# Patient Record
Sex: FEMALE | Race: WHITE | NOT HISPANIC OR LATINO | Employment: FULL TIME | URBAN - METROPOLITAN AREA
[De-identification: names, ages, dates, MRNs, and addresses within clinical notes are randomized per-mention and may not be internally consistent; named-entity substitution may affect disease eponyms.]

---

## 2022-09-01 LAB
HUMAN PAPILLOMAVIRUS (HPV): NORMAL
HUMAN PAPILLOMAVIRUS (HPV): NORMAL
PAP RECOMMENDATION EXT: NORMAL
PAP RECOMMENDATION EXT: NORMAL
PAP SMEAR: NORMAL
PAP SMEAR: NORMAL

## 2022-09-06 LAB
BCS RECOMMENDATION EXT: NORMAL
BMD RECOMMENDATION EXT: NORMAL

## 2022-11-08 LAB — CRC RECOMMENDATION EXT: NORMAL

## 2022-11-28 ENCOUNTER — PATIENT OUTREACH (OUTPATIENT)
Dept: ADMINISTRATIVE | Facility: HOSPITAL | Age: 62
End: 2022-11-28
Payer: COMMERCIAL

## 2022-11-28 NOTE — PROGRESS NOTES
2022 Care Everywhere updates requested and reviewed.  Immunizations reconciled. Media reports reviewed.  Duplicate HM overrides and  orders removed.  Overdue HM topic chart audit and/or requested.  Overdue lab testing linked to upcoming lab appointments if applies.  Lab jame, and Mobile Patrol reviewed   Lab testing    HM updated with external pap/hpv/mammo/dexa report.     Health Maintenance Due   Topic Date Due    Hepatitis C Screening  Never done    Lipid Panel  Never done    HIV Screening  Never done    Colorectal Cancer Screening  Never done    COVID-19 Vaccine (2 - Pfizer series) 2021    Influenza Vaccine (1) 2022

## 2022-11-28 NOTE — LETTER
November 28, 2022    Nenita Armstrong  [address] [facility] [address]             [facility]  [address]  [facility] [address]  Phone: [phone] Dear Rhonda, [facility] is committed to your overall health.  To help you get the most out of each of your visits, we will review your information to make sure you are up to date on all of your recommended tests and/or procedures.      As a new patient to Riky Nielsen MD, we may not have your complete medical records.  After reviewing your chart have found that you may be due for the following:    Health Maintenance Due   Topic    Hepatitis C Screening     Lipid Panel     HIV Screening     Colorectal Cancer Screening     COVID-19 Vaccine (2 - Pfizer series)    Influenza Vaccine        If you have had any of the above done at another facility, please bring the records or information with you so that your record at [facility] will be complete.      If you are currently taking medication, please bring it with you to your appointment for review.    Thank you for letting us care for you,      Your [facility] Team

## 2022-12-12 ENCOUNTER — OFFICE VISIT (OUTPATIENT)
Dept: FAMILY MEDICINE | Facility: CLINIC | Age: 62
End: 2022-12-12
Payer: COMMERCIAL

## 2022-12-12 ENCOUNTER — PATIENT OUTREACH (OUTPATIENT)
Dept: ADMINISTRATIVE | Facility: HOSPITAL | Age: 62
End: 2022-12-12
Payer: COMMERCIAL

## 2022-12-12 VITALS
BODY MASS INDEX: 24.35 KG/M2 | HEIGHT: 63 IN | SYSTOLIC BLOOD PRESSURE: 112 MMHG | WEIGHT: 137.44 LBS | DIASTOLIC BLOOD PRESSURE: 68 MMHG

## 2022-12-12 DIAGNOSIS — E55.9 VITAMIN D DEFICIENCY: ICD-10-CM

## 2022-12-12 DIAGNOSIS — Z00.00 WELLNESS EXAMINATION: Primary | ICD-10-CM

## 2022-12-12 DIAGNOSIS — Z11.59 NEED FOR HEPATITIS C SCREENING TEST: ICD-10-CM

## 2022-12-12 DIAGNOSIS — Z01.89 ENCOUNTER FOR ROUTINE LABORATORY TESTING: ICD-10-CM

## 2022-12-12 DIAGNOSIS — M85.89 OSTEOPENIA OF MULTIPLE SITES: ICD-10-CM

## 2022-12-12 PROCEDURE — 3074F SYST BP LT 130 MM HG: CPT | Mod: CPTII,S$GLB,, | Performed by: INTERNAL MEDICINE

## 2022-12-12 PROCEDURE — 3008F PR BODY MASS INDEX (BMI) DOCUMENTED: ICD-10-PCS | Mod: CPTII,S$GLB,, | Performed by: INTERNAL MEDICINE

## 2022-12-12 PROCEDURE — 99386 PR PREVENTIVE VISIT,NEW,40-64: ICD-10-PCS | Mod: S$GLB,,, | Performed by: INTERNAL MEDICINE

## 2022-12-12 PROCEDURE — 3074F PR MOST RECENT SYSTOLIC BLOOD PRESSURE < 130 MM HG: ICD-10-PCS | Mod: CPTII,S$GLB,, | Performed by: INTERNAL MEDICINE

## 2022-12-12 PROCEDURE — 3078F DIAST BP <80 MM HG: CPT | Mod: CPTII,S$GLB,, | Performed by: INTERNAL MEDICINE

## 2022-12-12 PROCEDURE — 1160F PR REVIEW ALL MEDS BY PRESCRIBER/CLIN PHARMACIST DOCUMENTED: ICD-10-PCS | Mod: CPTII,S$GLB,, | Performed by: INTERNAL MEDICINE

## 2022-12-12 PROCEDURE — 99999 PR PBB SHADOW E&M-EST. PATIENT-LVL III: ICD-10-PCS | Mod: PBBFAC,,, | Performed by: INTERNAL MEDICINE

## 2022-12-12 PROCEDURE — 1159F PR MEDICATION LIST DOCUMENTED IN MEDICAL RECORD: ICD-10-PCS | Mod: CPTII,S$GLB,, | Performed by: INTERNAL MEDICINE

## 2022-12-12 PROCEDURE — 3078F PR MOST RECENT DIASTOLIC BLOOD PRESSURE < 80 MM HG: ICD-10-PCS | Mod: CPTII,S$GLB,, | Performed by: INTERNAL MEDICINE

## 2022-12-12 PROCEDURE — 99386 PREV VISIT NEW AGE 40-64: CPT | Mod: S$GLB,,, | Performed by: INTERNAL MEDICINE

## 2022-12-12 PROCEDURE — 99999 PR PBB SHADOW E&M-EST. PATIENT-LVL III: CPT | Mod: PBBFAC,,, | Performed by: INTERNAL MEDICINE

## 2022-12-12 PROCEDURE — 3008F BODY MASS INDEX DOCD: CPT | Mod: CPTII,S$GLB,, | Performed by: INTERNAL MEDICINE

## 2022-12-12 PROCEDURE — 1160F RVW MEDS BY RX/DR IN RCRD: CPT | Mod: CPTII,S$GLB,, | Performed by: INTERNAL MEDICINE

## 2022-12-12 PROCEDURE — 1159F MED LIST DOCD IN RCRD: CPT | Mod: CPTII,S$GLB,, | Performed by: INTERNAL MEDICINE

## 2022-12-12 RX ORDER — SODIUM PICOSULFATE, MAGNESIUM OXIDE, AND ANHYDROUS CITRIC ACID 10; 3.5; 12 MG/160ML; G/160ML; G/160ML
LIQUID ORAL
COMMUNITY
Start: 2022-10-31 | End: 2024-01-29

## 2022-12-12 NOTE — LETTER
AUTHORIZATION FOR RELEASE OF   CONFIDENTIAL INFORMATION    Dear Chad Valladares MD,    We are seeing Nenita Armstrong, date of birth 1960, in the clinic at [facility]. Riky Nielsen MD is the patient's PCP. Nenita Armstrong has an outstanding lab/procedure at the time we reviewed her chart. In order to help keep her health information updated, she has authorized us to request the following medical record(s):        (  )  MAMMOGRAM                                      (X)  COLONOSCOPY/PATH/RECALL      (  )  PAP SMEAR                                          (  )  OUTSIDE LAB RESULTS     (  )  DEXA SCAN                                          (  )  EYE EXAM            (  )  FOOT EXAM                                          (  )  ENTIRE RECORD     (  )  OUTSIDE IMMUNIZATIONS                 (  )  _______________         Please fax records to [facility], Brandon D Simon-Davis, MD, [phone]    If you have any questions, please contact Michael Alves LPN Care Coordinator  at [phone].            Patient Name: Nenita Armstrong  : 1960  Patient Phone #: [phone]

## 2022-12-12 NOTE — PROGRESS NOTES
Subjective:       Patient ID: Nenita Armstrong is a 62 y.o. female.    Chief Complaint: Establish Care  Gyn: Dr Oliveros    MM2022  Dexa: Osetopenia 2022   Colonoscopy:  2nd Dr Valladares 2022 normal   Immunizations: Flu: Tdap: 2016 Pneumovax: Prevnar 13: Shingles: Covid:   Smoker:  never     HPI    Here to establish care no specific complaints.  Will get fasting labs.        Review of Systems:  Review of Systems   Constitutional:  Negative for appetite change.   HENT:  Negative for nosebleeds.    Eyes:  Negative for pain.   Respiratory:  Negative for choking.    Cardiovascular:  Negative for chest pain.   Gastrointestinal:  Negative for blood in stool.   Genitourinary:  Negative for hematuria.   Musculoskeletal:  Negative for joint swelling.   Skin:  Negative for pallor.   Neurological:  Negative for facial asymmetry.   Hematological:  Does not bruise/bleed easily.   Psychiatric/Behavioral:  Negative for confusion.      Objective:     Wt Readings from Last 3 Encounters:   22 62.4 kg (137 lb 7.3 oz)     Temp Readings from Last 3 Encounters:   No data found for Temp     BP Readings from Last 3 Encounters:   22 112/68         Physical Exam  Constitutional:       Appearance: Normal appearance.   HENT:      Head: Normocephalic and atraumatic.   Eyes:      Extraocular Movements: Extraocular movements intact.      Pupils: Pupils are equal, round, and reactive to light.   Cardiovascular:      Rate and Rhythm: Normal rate and regular rhythm.   Pulmonary:      Effort: Pulmonary effort is normal.      Breath sounds: Normal breath sounds.   Neurological:      Mental Status: She is alert and oriented to person, place, and time.   Psychiatric:         Mood and Affect: Mood normal.       Medication List with Changes/Refills   Current Medications    CLENPIQ 10 MG-3.5 GRAM -12 GRAM/160 ML SOLN    SMARTSIG:Unspecified By Mouth As Directed       Assessment & Plan:  1. Wellness examination    2. Vitamin D  deficiency  - Vitamin D; Future  - Vitamin D    3. Encounter for routine laboratory testing  - CBC Without Differential; Future  - Comprehensive Metabolic Panel; Future  - Lipid Panel; Future  - TSH; Future  - Vitamin D; Future  - Urinalysis; Future  - CBC Without Differential  - Comprehensive Metabolic Panel  - Lipid Panel  - TSH  - Vitamin D  - Urinalysis  - Hepatitis C Antibody; Future  - Hepatitis C Antibody    4. Osteopenia of multiple sites    5. Need for hepatitis C screening test  - Hepatitis C Antibody; Future  - Hepatitis C Antibody     Wellness examination    Vitamin D deficiency  -     Vitamin D; Future; Expected date: 12/12/2022    Encounter for routine laboratory testing  -     CBC Without Differential; Future; Expected date: 12/12/2022  -     Comprehensive Metabolic Panel; Future; Expected date: 12/12/2022  -     Lipid Panel; Future; Expected date: 12/12/2022  -     TSH; Future; Expected date: 12/12/2022  -     Vitamin D; Future; Expected date: 12/12/2022  -     Urinalysis; Future; Expected date: 12/12/2022  -     Hepatitis C Antibody; Future; Expected date: 12/12/2022    Osteopenia of multiple sites    Need for hepatitis C screening test  -     Hepatitis C Antibody; Future; Expected date: 12/12/2022        Continue to work on regular exercise, maintain healthy weight, balanced diet. Avoid unhealthy habits: smoking, excessive alcohol intake.

## 2022-12-12 NOTE — LETTER
AUTHORIZATION FOR RELEASE OF   CONFIDENTIAL INFORMATION    Dear Dr. Valladares,    We are seeing Nenita Armstrong, date of birth 1960, in the clinic at No Department Specified. Primary Doctor No is the patient's PCP. Nenita Armstrong has an outstanding lab/procedure at the time we reviewed her chart. In order to help keep her health information updated, she has authorized us to request the following medical record(s):        (  )  MAMMOGRAM                                      ( X )  COLONOSCOPY      (  )  PAP SMEAR                                          ( X )  OUTSIDE LAB RESULTS     (  )  DEXA SCAN                                          (  )  EYE EXAM            (  )  FOOT EXAM                                          (  )  ENTIRE RECORD     (  )  OUTSIDE IMMUNIZATIONS                 (  )  _______________         Please fax records to Dr. Nielsen, [phone]     If you have any questions, please contact Kimberly PERSAUD MA at [phone].           Patient Name: Nenita Armstrong  : 1960  Patient Phone #: [phone]

## 2022-12-13 ENCOUNTER — PATIENT OUTREACH (OUTPATIENT)
Dept: ADMINISTRATIVE | Facility: HOSPITAL | Age: 62
End: 2022-12-13
Payer: COMMERCIAL

## 2023-01-13 LAB
25(OH)D3+25(OH)D2 SERPL-MCNC: 73 NG/ML (ref 30–100)
ALBUMIN SERPL-MCNC: 4.6 G/DL (ref 3.8–4.8)
ALBUMIN/GLOB SERPL: 2 {RATIO} (ref 1.2–2.2)
ALP SERPL-CCNC: 103 IU/L (ref 44–121)
ALT SERPL-CCNC: 21 IU/L (ref 0–32)
APPEARANCE UR: ABNORMAL
AST SERPL-CCNC: 20 IU/L (ref 0–40)
BACTERIA #/AREA URNS HPF: ABNORMAL /[HPF]
BILIRUB SERPL-MCNC: 0.7 MG/DL (ref 0–1.2)
BILIRUB UR QL STRIP: NEGATIVE
BUN SERPL-MCNC: 22 MG/DL (ref 8–27)
BUN/CREAT SERPL: 34 (ref 12–28)
CALCIUM SERPL-MCNC: 9.5 MG/DL (ref 8.7–10.3)
CASTS URNS QL MICRO: ABNORMAL /LPF
CHLORIDE SERPL-SCNC: 101 MMOL/L (ref 96–106)
CHOLEST SERPL-MCNC: 214 MG/DL (ref 100–199)
CO2 SERPL-SCNC: 27 MMOL/L (ref 20–29)
COLOR UR: YELLOW
CREAT SERPL-MCNC: 0.64 MG/DL (ref 0.57–1)
CRYSTALS URNS MICRO: ABNORMAL
EPI CELLS #/AREA URNS HPF: >10 /HPF (ref 0–10)
ERYTHROCYTE [DISTWIDTH] IN BLOOD BY AUTOMATED COUNT: 12.4 % (ref 11.7–15.4)
EST. GFR  (NO RACE VARIABLE): 100 ML/MIN/1.73
GLOBULIN SER CALC-MCNC: 2.3 G/DL (ref 1.5–4.5)
GLUCOSE SERPL-MCNC: 98 MG/DL (ref 70–99)
GLUCOSE UR QL STRIP: NEGATIVE
HCT VFR BLD AUTO: 40.3 % (ref 34–46.6)
HCV AB S/CO SERPL IA: <0.1 S/CO RATIO (ref 0–0.9)
HDLC SERPL-MCNC: 86 MG/DL
HGB BLD-MCNC: 13.3 G/DL (ref 11.1–15.9)
HGB UR QL STRIP: NEGATIVE
KETONES UR QL STRIP: NEGATIVE
LDLC SERPL CALC-MCNC: 119 MG/DL (ref 0–99)
LEUKOCYTE ESTERASE UR QL STRIP: ABNORMAL
MCH RBC QN AUTO: 29.3 PG (ref 26.6–33)
MCHC RBC AUTO-ENTMCNC: 33 G/DL (ref 31.5–35.7)
MCV RBC AUTO: 89 FL (ref 79–97)
MICRO URNS: ABNORMAL
NITRITE UR QL STRIP: NEGATIVE
PH UR STRIP: 6.5 [PH] (ref 5–7.5)
PLATELET # BLD AUTO: 247 X10E3/UL (ref 150–450)
POTASSIUM SERPL-SCNC: 4.3 MMOL/L (ref 3.5–5.2)
PROT SERPL-MCNC: 6.9 G/DL (ref 6–8.5)
PROT UR QL STRIP: ABNORMAL
RBC # BLD AUTO: 4.54 X10E6/UL (ref 3.77–5.28)
RBC #/AREA URNS HPF: ABNORMAL /HPF (ref 0–2)
SODIUM SERPL-SCNC: 140 MMOL/L (ref 134–144)
SP GR UR STRIP: 1.02 (ref 1–1.03)
TRIGL SERPL-MCNC: 50 MG/DL (ref 0–149)
TSH SERPL DL<=0.005 MIU/L-ACNC: 2.1 UIU/ML (ref 0.45–4.5)
UNIDENT CRYS URNS QL MICRO: PRESENT
UROBILINOGEN UR STRIP-MCNC: 0.2 MG/DL (ref 0.2–1)
VLDLC SERPL CALC-MCNC: 9 MG/DL (ref 5–40)
WBC # BLD AUTO: 4.1 X10E3/UL (ref 3.4–10.8)
WBC #/AREA URNS HPF: ABNORMAL /HPF (ref 0–5)

## 2023-01-18 DIAGNOSIS — R82.90 ABNORMAL URINALYSIS: Primary | ICD-10-CM

## 2023-09-26 DIAGNOSIS — Z12.31 OTHER SCREENING MAMMOGRAM: ICD-10-CM

## 2023-12-13 ENCOUNTER — TELEPHONE (OUTPATIENT)
Dept: FAMILY MEDICINE | Facility: CLINIC | Age: 63
End: 2023-12-13
Payer: COMMERCIAL

## 2023-12-13 DIAGNOSIS — Z00.00 WELLNESS EXAMINATION: Primary | ICD-10-CM

## 2023-12-13 NOTE — TELEPHONE ENCOUNTER
Pt requesting appt for annual mid- January. First available showing March. Please advise if OK to schedule pt sooner.

## 2023-12-13 NOTE — TELEPHONE ENCOUNTER
----- Message from Artur Hutchison sent at 12/13/2023 11:15 AM CST -----  Contact: self  Type: Sooner Appointment Request        Caller is requesting a sooner appointment. Caller declined first available appointment listed below. Caller will not accept being placed on the waitlist and is requesting a message be sent to doctor.        Name of Caller: Patient   Best Call Back Number: [phone]  Additional Information: Annual Check Up needed. Plz get pt scheduled before February (mid January 11 am or later if possible.). Plz add the pt labs so she can get them, done before appt. Thanks

## 2023-12-14 ENCOUNTER — TELEPHONE (OUTPATIENT)
Dept: FAMILY MEDICINE | Facility: CLINIC | Age: 63
End: 2023-12-14
Payer: COMMERCIAL

## 2023-12-14 NOTE — TELEPHONE ENCOUNTER
----- Message from Annelise Gilmore sent at 12/14/2023 12:21 PM CST -----  Regarding: Patient Returning Call  Contact: patient at [phone]  Type:  Patient Returning Call    Who Called:  patient at [phone]    Who Left Message for Patient:  Kayla Bhagat MA  Does the patient know what this is regarding?:  yes    Additional Information:  Please call and advise. Thank you

## 2023-12-14 NOTE — TELEPHONE ENCOUNTER
----- Message from Irene Williamson sent at 12/14/2023  3:01 PM CST -----  Type:  Patient Returning Call    Who Called:  pt  Who Left Message for Patient:  Kayla  Does the patient know what this is regarding?:  yes  Best Call Back Number:  [phone]    Additional Information:  Pt is returning a call in regards to scheduling her appt. Please call back and advise. Thanks!

## 2024-01-16 LAB
ALBUMIN SERPL-MCNC: 4.4 G/DL (ref 3.9–4.9)
ALBUMIN/GLOB SERPL: 1.9 {RATIO} (ref 1.2–2.2)
ALP SERPL-CCNC: 101 IU/L (ref 44–121)
ALT SERPL-CCNC: 20 IU/L (ref 0–32)
AST SERPL-CCNC: 24 IU/L (ref 0–40)
BILIRUB SERPL-MCNC: 0.7 MG/DL (ref 0–1.2)
BUN SERPL-MCNC: 20 MG/DL (ref 8–27)
BUN/CREAT SERPL: 34 (ref 12–28)
CALCIUM SERPL-MCNC: 9.3 MG/DL (ref 8.7–10.3)
CHLORIDE SERPL-SCNC: 101 MMOL/L (ref 96–106)
CHOLEST SERPL-MCNC: 227 MG/DL (ref 100–199)
CO2 SERPL-SCNC: 27 MMOL/L (ref 20–29)
CREAT SERPL-MCNC: 0.58 MG/DL (ref 0.57–1)
ERYTHROCYTE [DISTWIDTH] IN BLOOD BY AUTOMATED COUNT: 12.3 % (ref 11.7–15.4)
EST. GFR  (NO RACE VARIABLE): 102 ML/MIN/1.73
GLOBULIN SER CALC-MCNC: 2.3 G/DL (ref 1.5–4.5)
GLUCOSE SERPL-MCNC: 96 MG/DL (ref 70–99)
HCT VFR BLD AUTO: 42.3 % (ref 34–46.6)
HDLC SERPL-MCNC: 98 MG/DL
HGB BLD-MCNC: 14 G/DL (ref 11.1–15.9)
LDLC SERPL CALC-MCNC: 120 MG/DL (ref 0–99)
MCH RBC QN AUTO: 29.9 PG (ref 26.6–33)
MCHC RBC AUTO-ENTMCNC: 33.1 G/DL (ref 31.5–35.7)
MCV RBC AUTO: 90 FL (ref 79–97)
PLATELET # BLD AUTO: 246 X10E3/UL (ref 150–450)
POTASSIUM SERPL-SCNC: 4.3 MMOL/L (ref 3.5–5.2)
PROT SERPL-MCNC: 6.7 G/DL (ref 6–8.5)
RBC # BLD AUTO: 4.69 X10E6/UL (ref 3.77–5.28)
SODIUM SERPL-SCNC: 139 MMOL/L (ref 134–144)
TRIGL SERPL-MCNC: 50 MG/DL (ref 0–149)
VLDLC SERPL CALC-MCNC: 9 MG/DL (ref 5–40)
WBC # BLD AUTO: 4 X10E3/UL (ref 3.4–10.8)

## 2024-01-29 ENCOUNTER — OFFICE VISIT (OUTPATIENT)
Dept: FAMILY MEDICINE | Facility: CLINIC | Age: 64
End: 2024-01-29
Payer: COMMERCIAL

## 2024-01-29 VITALS
OXYGEN SATURATION: 97 % | BODY MASS INDEX: 26.13 KG/M2 | DIASTOLIC BLOOD PRESSURE: 74 MMHG | HEART RATE: 72 BPM | SYSTOLIC BLOOD PRESSURE: 116 MMHG | WEIGHT: 147.5 LBS | RESPIRATION RATE: 16 BRPM | HEIGHT: 63 IN

## 2024-01-29 DIAGNOSIS — E55.9 VITAMIN D DEFICIENCY: ICD-10-CM

## 2024-01-29 DIAGNOSIS — Z00.00 WELLNESS EXAMINATION: Primary | ICD-10-CM

## 2024-01-29 DIAGNOSIS — N81.4 UTERINE PROLAPSE: ICD-10-CM

## 2024-01-29 DIAGNOSIS — M85.89 OSTEOPENIA OF MULTIPLE SITES: ICD-10-CM

## 2024-01-29 PROCEDURE — 1160F RVW MEDS BY RX/DR IN RCRD: CPT | Mod: CPTII,S$GLB,, | Performed by: INTERNAL MEDICINE

## 2024-01-29 PROCEDURE — 99396 PREV VISIT EST AGE 40-64: CPT | Mod: S$GLB,,, | Performed by: INTERNAL MEDICINE

## 2024-01-29 PROCEDURE — 1159F MED LIST DOCD IN RCRD: CPT | Mod: CPTII,S$GLB,, | Performed by: INTERNAL MEDICINE

## 2024-01-29 PROCEDURE — 99999 PR PBB SHADOW E&M-EST. PATIENT-LVL III: CPT | Mod: PBBFAC,,, | Performed by: INTERNAL MEDICINE

## 2024-01-29 PROCEDURE — 3078F DIAST BP <80 MM HG: CPT | Mod: CPTII,S$GLB,, | Performed by: INTERNAL MEDICINE

## 2024-01-29 PROCEDURE — 3008F BODY MASS INDEX DOCD: CPT | Mod: CPTII,S$GLB,, | Performed by: INTERNAL MEDICINE

## 2024-01-29 PROCEDURE — 3074F SYST BP LT 130 MM HG: CPT | Mod: CPTII,S$GLB,, | Performed by: INTERNAL MEDICINE

## 2024-01-29 NOTE — PROGRESS NOTES
Subjective:       Patient ID: Nenita Armstrong is a 63 y.o. female.    Chief Complaint: Annual Exam (Patient is coming in today for her annual and would like to review her blood work.)   HPI    Gyn: Dr Oliveros   MM2022  Dexa: Osetopenia 2022   Colonoscopy:  2nd Dr Valladares 2022 normal   Immunizations: Flu: Tdap: 2016 Shingles: Y Covid:   Smoker:  never      Wellness   Labs review      Uterine prolapse  - plan is repair sometime this yr.        ____________________________________________________________________________________________________  Assessment & Plan:  1. Wellness examination    2. Osteopenia of multiple sites    3. Uterine prolapse    4. Vitamin D deficiency     Wellness examination    Osteopenia of multiple sites    Uterine prolapse  Comments:  plan repair     Vitamin D deficiency        Continue to work on regular exercise, maintain healthy weight, balanced diet. Avoid unhealthy habits: smoking, excessive alcohol intake.     Disclaimer: This note was partly generated using dictation software which may occasionally result in transcription errors  ____________________________________________________________________________________________________  Review of Systems:  Review of Systems   Constitutional:  Negative for appetite change.   HENT:  Negative for nosebleeds.    Eyes:  Negative for pain.   Respiratory:  Negative for choking.    Cardiovascular:  Negative for chest pain.   Gastrointestinal:  Negative for blood in stool.   Genitourinary:  Negative for hematuria.   Musculoskeletal:  Negative for joint swelling.   Skin:  Negative for pallor.   Neurological:  Negative for facial asymmetry.   Hematological:  Does not bruise/bleed easily.   Psychiatric/Behavioral:  Negative for confusion.        Objective:     Wt Readings from Last 3 Encounters:   24 66.9 kg (147 lb 7.8 oz)   22 62.4 kg (137 lb 7.3 oz)     BP Readings from Last 3 Encounters:   24 116/74   22 112/68  "      Lab Results   Component Value Date    WBC 4.0 01/15/2024    HGB 14.0 01/15/2024    HCT 42.3 01/15/2024     01/15/2024     01/15/2024    K 4.3 01/15/2024     01/15/2024    ALT 20 01/15/2024    AST 24 01/15/2024    CO2 27 01/15/2024    CREATININE 0.58 01/15/2024    BUN 20 01/15/2024    GLU 96 01/15/2024      No results found for: "HGBA1C"   Lab Results   Component Value Date    TSH 2.100 01/12/2023     No results found for: "FREET4"  Lab Results   Component Value Date    LDLCALC 120 (H) 01/15/2024    LDLCALC 119 (H) 01/12/2023     Lab Results   Component Value Date    TRIG 50 01/15/2024    TRIG 50 01/12/2023            Physical Exam  Constitutional:       Appearance: Normal appearance.   HENT:      Head: Normocephalic and atraumatic.   Eyes:      Extraocular Movements: Extraocular movements intact.      Pupils: Pupils are equal, round, and reactive to light.   Cardiovascular:      Rate and Rhythm: Normal rate.   Pulmonary:      Effort: Pulmonary effort is normal.   Neurological:      Mental Status: She is alert and oriented to person, place, and time.   Psychiatric:         Mood and Affect: Mood normal.         Medication List with Changes/Refills   Current Medications    CHOLECALCIFEROL, VITD3,/VIT K2 (VITAMIN D3-VITAMIN K2 ORAL)    Take 1 tablet by mouth once daily. 5000   Discontinued Medications    CLENPIQ 10 MG-3.5 GRAM -12 GRAM/160 ML SOLN    SMARTSIG:Unspecified By Mouth As Directed       "

## 2024-05-02 ENCOUNTER — PATIENT MESSAGE (OUTPATIENT)
Dept: FAMILY MEDICINE | Facility: CLINIC | Age: 64
End: 2024-05-02
Payer: COMMERCIAL

## 2024-05-10 RX ORDER — SCOLOPAMINE TRANSDERMAL SYSTEM 1 MG/1
1 PATCH, EXTENDED RELEASE TRANSDERMAL
Qty: 4 PATCH | Refills: 0 | Status: SHIPPED | OUTPATIENT
Start: 2024-05-10

## 2024-07-11 ENCOUNTER — TELEPHONE (OUTPATIENT)
Dept: UROGYNECOLOGY | Facility: CLINIC | Age: 64
End: 2024-07-11
Payer: COMMERCIAL

## 2024-07-11 NOTE — TELEPHONE ENCOUNTER
Called pt about upcoming appt to get more details. Referred by gynecologist for vaginal prolapse and potential hysterectomy candidate.

## 2024-07-15 ENCOUNTER — OFFICE VISIT (OUTPATIENT)
Dept: UROGYNECOLOGY | Facility: CLINIC | Age: 64
End: 2024-07-15
Payer: COMMERCIAL

## 2024-07-15 VITALS
SYSTOLIC BLOOD PRESSURE: 120 MMHG | BODY MASS INDEX: 25.58 KG/M2 | WEIGHT: 144.38 LBS | DIASTOLIC BLOOD PRESSURE: 82 MMHG | HEIGHT: 63 IN | HEART RATE: 77 BPM

## 2024-07-15 DIAGNOSIS — N81.6 RECTOCELE, FEMALE: ICD-10-CM

## 2024-07-15 DIAGNOSIS — N81.4 UTERINE PROLAPSE: ICD-10-CM

## 2024-07-15 DIAGNOSIS — N95.2 VAGINAL ATROPHY: ICD-10-CM

## 2024-07-15 DIAGNOSIS — N81.11 CYSTOCELE, MIDLINE: ICD-10-CM

## 2024-07-15 DIAGNOSIS — N39.41 URINARY INCONTINENCE, URGE: Primary | ICD-10-CM

## 2024-07-15 PROCEDURE — 3008F BODY MASS INDEX DOCD: CPT | Mod: CPTII,S$GLB,, | Performed by: OBSTETRICS & GYNECOLOGY

## 2024-07-15 PROCEDURE — 3074F SYST BP LT 130 MM HG: CPT | Mod: CPTII,S$GLB,, | Performed by: OBSTETRICS & GYNECOLOGY

## 2024-07-15 PROCEDURE — 87086 URINE CULTURE/COLONY COUNT: CPT | Performed by: OBSTETRICS & GYNECOLOGY

## 2024-07-15 PROCEDURE — 1160F RVW MEDS BY RX/DR IN RCRD: CPT | Mod: CPTII,S$GLB,, | Performed by: OBSTETRICS & GYNECOLOGY

## 2024-07-15 PROCEDURE — 99205 OFFICE O/P NEW HI 60 MIN: CPT | Mod: 25,S$GLB,, | Performed by: OBSTETRICS & GYNECOLOGY

## 2024-07-15 PROCEDURE — 1159F MED LIST DOCD IN RCRD: CPT | Mod: CPTII,S$GLB,, | Performed by: OBSTETRICS & GYNECOLOGY

## 2024-07-15 PROCEDURE — 99999 PR PBB SHADOW E&M-EST. PATIENT-LVL IV: CPT | Mod: PBBFAC,,, | Performed by: OBSTETRICS & GYNECOLOGY

## 2024-07-15 PROCEDURE — 3079F DIAST BP 80-89 MM HG: CPT | Mod: CPTII,S$GLB,, | Performed by: OBSTETRICS & GYNECOLOGY

## 2024-07-15 PROCEDURE — 51701 INSERT BLADDER CATHETER: CPT | Mod: S$GLB,,, | Performed by: OBSTETRICS & GYNECOLOGY

## 2024-07-15 RX ORDER — ESTRADIOL 0.1 MG/G
CREAM VAGINAL
Qty: 42.5 G | Refills: 11 | Status: SHIPPED | OUTPATIENT
Start: 2024-07-15

## 2024-07-15 NOTE — PROGRESS NOTES
[facility] - UROGYNECOLOGY  [address] [address]    Nenita Armstrong  [id number]  1960  July 15, 2024    Consulting Physician: Brandi Fisher MD   GYN: Natalia Hdz M.D.: Riky Nielsen MD    Chief Complaint   Patient presents with    Vaginal Prolapse     Identified by OB/Gyn, sometimes blocks urination and needs to adjust.       HPI:     1)  UI:  (--) GAB. (+) UUI (occasionally, ann 1st AM).  (--) pads, +rare underwear changes.  Daytime frequency: Q 3 hours.  Nocturia: Yes: 1/night.   (--) dysuria,  (--) hematuria,  (--) frequent UTIs.  (--) complete bladder emptying. PV to help.     2)  POP:  Present. below introitus.  Symptoms:(+)  pressure, feels weird, has to PV.  (--) vaginal bleeding. (--) vaginal discharge. (--) sexually active.  (--) h/o dyspareunia.   has had some ED.  (--)  Vaginal dryness.  (--) vaginal estrogen use. Is supposed to use vaginal estrogen.     3)  BM:  (--) constipation/straining.  (--) chronic diarrhea. (--) hematochezia.  (--) fecal incontinence.  (--) fecal smearing/urgency.  (+) complete evacuation.     Past Medical History  Past Medical History:   Diagnosis Date    Kidney stone      Past Surgical History  Alden teeth  Breast augmentation   D&C  History reviewed. No pertinent surgical history.     Hysterectomy: No      Past Ob History    SAB x 1   x 4.  C/s x 0.    Largest infant weight: 8#9oz.   no FAVD. yes episiotomy.      Gynecologic History  LMP: No LMP recorded. Patient is postmenopausal.  Age of menarche: 12 yo  Age of menopause: late 40s  Menstrual history: oligomenorrhea; needed clomid for 1st/3rd conceptions  Pap test:  normal/HPV neg.  History of abnormal paps: No.  History of STIs:  No  Mammogram: Date of last: .  Result: Normal per report  Colonoscopy: Date of last: .  Result:  normal.  Repeat due:  .    DEXA:  Date of last: .  Result:  osteopenia, FRAX not sig elevated--taking D.   "Repeat due: per GYN/PCP.     Family History  Family History   Problem Relation Name Age of Onset    Lung cancer Mother Maddy Irwin         not smoker    Cancer Mother Maddy Irwin     Stomach cancer Father Santosh Irwin         age 80's s/p sx and no chemo    Cancer Father Santosh Irwin     No Known Problems Sister     --mother  from [place] 47 yo (non-smoker)    Colon CA: No  Breast CA: No  GYN CA: No   CA: No    Social History  Social History     Tobacco Use   Smoking Status Never   Smokeless Tobacco Never     Social History     Substance and Sexual Activity   Alcohol Use Yes    Alcohol/week: 3.0 standard drinks of alcohol    Types: 3 Glasses of wine per week   .    Social History     Substance and Sexual Activity   Drug Use Never     The patient is .  Resides in [place] [address].  Employment status: currently employed  at [employer].     Allergies  Review of patient's allergies indicates:  No Known Allergies    Medications  Current Outpatient Medications on File Prior to Visit   Medication Sig Dispense Refill    cholecalciferol, vitD3,/vit K2 (VITAMIN D3-VITAMIN K2 ORAL) Take 1 tablet by mouth once daily. 5000      scopolamine (TRANSDERM-SCOP) 1.3-1.5 mg (1 mg over 3 days) Place 1 patch onto the skin every 72 hours. 4 patch 0     No current facility-administered medications on file prior to visit.       Review of Systems A 14 point ROS was reviewed with pertinent positives as noted above in the history of present illness.      Constitutional: negative  Eyes: negative  Endocrine: negative  Gastrointestinal: negative  Cardiovascular: negative  Respiratory: negative  Allergic/Immunologic: negative  Integumentary: negative  Psychiatric: negative  Musculoskeletal: negative   Ear/Nose/Throat: negative  Neurologic: negative  Genitourinary: SEE HPI  Hematologic/Lymphatic: negative   Breast: negative    Urogynecologic Exam  /82   Pulse 77   Ht 5' 3" " (1.6 m)   Wt 65.5 kg (144 lb 6.4 oz)   BMI 25.58 kg/m²     GENERAL APPEARANCE:  The patient is well-developed, well-nourished.   Neck:  Supple with no thyromegaly, no carotid bruits.  Heart:  Regular rate and rhythm, no murmurs, rubs or gallops.  Lungs:  Clear.  No CVA tenderness.  Abdomen:  Soft, nontender, nondistended, no hepatosplenomegaly.  Incisions:  none    PELVIC:    External genitalia:  Normal Bartholins, Skenes and labia bilaterally.    Urethra:  No caruncle, diverticulum or masses.  (+) hypermobility.    Vagina:  Atrophy + , no bladder masses or tender, no discharge.    Cervix:  normal appearance  Uterus: normal size, contour, position, consistency, mobility, non-tender  Adnexa: Not palpable.    POP-Q:  Aa +3; Ba +5; C +6; Ap +3; Bp +5; D -3.  Genital hiatus 4, perineal body 3, total vaginal length 10-11.    NEUROLOGIC:  Cranial nerves 2 through 12 intact.  Strength 5/5.  DTRs 2+ lower extremities.  S2 through 4 normal.  Sacral reflexes intact.    EXT: MORRIS, 2+ pulses bilaterally, no C/C/E    COUGH STRESS TEST:  negative  KEGEL: 1 /5    RECTAL:    External:  Normal, (--) hemorrhoids, (--) dovetailing.   Internal:   (--) tenderness, (--) masses, Normal resting tone, Normal active tone. Grossly heme NEG.     PVR: 50 mL    Impression    1. Urinary incontinence, urge    2. Uterine prolapse    3. Cystocele, midline    4. Rectocele, female    5. Vaginal atrophy        Initial Plan  The patient was counseled regarding these issues. The patient was given a summary sheet containing each of these issues with possible options for evaluation and management. When appropriate, we also reviewed computer-generated diagrams specific to their diagnoses..  All questions were addressed to the patient's satisfaction.    1)  Stage 3 cystocele/uterine prolapse, stage 2 rectocele:  --discussed  --options:  observation vs PT vs pessary vs surgery   --if surgery: robotic-hysterectomy, removal tubes/ovaries, sacral  colpopexy   --if surgery: pelvic US    --if surgery: bladder testing to see if have hidden stress leakage and need for treatment (injections  vs sling)   --if surgery: cleared per PCP + labs (CBC, BMP, T&S)/EKG    2)  Urge incontinence, rare:  --urine C&S  --Empty bladder every 2-3 hours.  Empty well: wait a minute, lean forward on toilet.    --Avoid dietary irritants (see sheet).  Keep diary x 3-5 days to determine your irritants.  --KEGELS: do 10 in AM and 10 in PM, holding each x 10 seconds.  When you feel urge to go, STOP, KEGEL, and when urge has passed, then go to bathroom.  Consider PT in future.    --URGE: consider medication. Takes 2-4 weeks to see if will have effect.  For dry mouth: get sour, sugar free lozenge or gum.      3)  Vaginal atrophy (dryness):    --start vaginal estrogen:  --Use 0.5 grams of estrogen cream in vagina with applicator or dime-sized amount with finger (as far as can reach internally and around bulge) nightly x 2 weeks, then twice a week thereafter.  You can also apply a dime-sized amount with your finger around the vaginal opening and inner lips at same frequency.     --Vaginal estrogen may help to decrease pain related to dryness with intercourse and urinary symptoms (urgency/frequency/UTIs) around menopause.     4)  Someone will call you to discuss OR dates.      Approximately 60 min were spent in consult, 90 % in discussion.   The patient's  was present for the entire discussion at the end.   Thank you for requesting consultation of your patient.  I look forward to participating in their care.    Barbara Beauchamp  Female Pelvic Medicine and Reconstructive Surgery  [facility] [place], [place]

## 2024-07-15 NOTE — PATIENT INSTRUCTIONS
Bladder Irritants  Certain foods and drinks have been associated with worsening symptoms of urinary frequency, urgency, urge incontinence, or bladder pain. If you suffer from any of these conditions, you may wish to try eliminating one or more of these foods from your diet and see if your symptoms improve. If bladder symptoms are related to dietary factors, strict adherence to a diet thateliminates the food should bring marked relief in 10 days. Once you are feeling better, you can begin to add foods back into your diet, one at a time. If symptoms return, you will be able to identify the irritant. As you add foods back to your diet it is very important that you drink significant amounts of water.    -----------------------------------------------------------------------------------------------  List of Common Bladder Irritants*  Alcoholic beverages  Apples and apple juice  Cantaloupe  Carbonated beverages  Chili and spicy foods  Chocolate  Citrus fruit  Coffee (including decaffeinated)  Cranberries and cranberry juice  Grapes  Guava  Milk Products: milk, cheese, cottage cheese, yogurt, ice cream  Peaches  Pineapple  Plums  Strawberries  Sugar especially artificial sweeteners, saccharin, aspartame, corn sweeteners, honey, fructose, sucrose, lactose  Tea  Tomatoes and tomato juice  Vitamin B complex  Vinegar  *Most people are not sensitive to ALL of these products; your goal is to find the foods that make YOUR symptoms worse.  ---------------------------------------------------------------------------------------------------    Low-acid fruit substitutions include apricots, papaya, pears and watermelon. Coffee drinkers can drink Kava or other lowacid instant drinks. Tea drinkers can substitute non-citrus herbal and sun brewed teas. Calcium carbonate co-buffered with calcium ascorbate can be substituted for Vitamin C. Prelief is a dietary supplement that works as an acid blocker for the bladder.    Where to get more  information:        Overcoming Bladder Disorders by Emelia Patiño and Kimo Leiva, 1990        You Dont Have to Live with Cystitis! By Alisha Suero, 1988  http://www.urologymanagement.org/oab  -----------------------------------------------------------------  1)  Stage 3 cystocele/uterine prolapse, stage 2 rectocele:  --discussed  --options:  observation vs PT vs pessary vs surgery   --if surgery: robotic-hysterectomy, removal tubes/ovaries, sacral colpopexy   --if surgery: pelvic US    --if surgery: bladder testing to see if have hidden stress leakage and need for treatment (injections  vs sling)   --if surgery: cleared per PCP + labs (CBC, BMP, T&S)/EKG    2)  Urge incontinence, rare:  --urine C&S  --Empty bladder every 2-3 hours.  Empty well: wait a minute, lean forward on toilet.    --Avoid dietary irritants (see sheet).  Keep diary x 3-5 days to determine your irritants.  --KEGELS: do 10 in AM and 10 in PM, holding each x 10 seconds.  When you feel urge to go, STOP, KEGEL, and when urge has passed, then go to bathroom.  Consider PT in future.    --URGE: consider medication. Takes 2-4 weeks to see if will have effect.  For dry mouth: get sour, sugar free lozenge or gum.      3)  Vaginal atrophy (dryness):    --start vaginal estrogen:  --Use 0.5 grams of estrogen cream in vagina with applicator or dime-sized amount with finger (as far as can reach internally and around bulge) nightly x 2 weeks, then twice a week thereafter.  You can also apply a dime-sized amount with your finger around the vaginal opening and inner lips at same frequency.     --Vaginal estrogen may help to decrease pain related to dryness with intercourse and urinary symptoms (urgency/frequency/UTIs) around menopause.     4)  Someone will call you to discuss OR dates.

## 2024-07-16 LAB — BACTERIA UR CULT: NO GROWTH

## 2024-07-17 ENCOUNTER — TELEPHONE (OUTPATIENT)
Dept: UROGYNECOLOGY | Facility: CLINIC | Age: 64
End: 2024-07-17
Payer: COMMERCIAL

## 2024-07-17 NOTE — TELEPHONE ENCOUNTER
Spoke with patient regarding upcoming surgery dates. 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 Was hoping to have gotten it done over the summer, but wasn't able to get in to see us until July. She is going to look at her calendar, see what she has available, & will call me back.

## 2025-01-06 DIAGNOSIS — N81.6 RECTOCELE, FEMALE: ICD-10-CM

## 2025-01-06 DIAGNOSIS — N81.4 UTERINE PROLAPSE: ICD-10-CM

## 2025-01-06 DIAGNOSIS — N81.11 CYSTOCELE, MIDLINE: ICD-10-CM

## 2025-01-06 DIAGNOSIS — N39.41 URINARY INCONTINENCE, URGE: Primary | ICD-10-CM

## 2025-01-06 DIAGNOSIS — N81.4 UTERINE PROLAPSE: Primary | ICD-10-CM

## 2025-01-10 ENCOUNTER — TELEPHONE (OUTPATIENT)
Dept: FAMILY MEDICINE | Facility: CLINIC | Age: 65
End: 2025-01-10
Payer: COMMERCIAL

## 2025-01-10 DIAGNOSIS — Z00.00 WELLNESS EXAMINATION: ICD-10-CM

## 2025-01-10 DIAGNOSIS — Z01.818 PRE-OP TESTING: Primary | ICD-10-CM

## 2025-01-10 DIAGNOSIS — R82.90 ABNORMAL URINALYSIS: ICD-10-CM

## 2025-01-10 DIAGNOSIS — N81.4 UTERINE PROLAPSE: ICD-10-CM

## 2025-01-10 NOTE — TELEPHONE ENCOUNTER
She has visit coming up for Pre op.    Have her get fasting labs done before visit and xray chest.   Includes urine screen     We will due EKG at office visit

## 2025-01-10 NOTE — TELEPHONE ENCOUNTER
----- Message from Med Assistant Self sent at 1/6/2025 10:50 AM CST -----  Regarding: Preop Clearance  Nenita is scheduled for a Robotic Hysterectomy/Sacral Culpopexy with Dr Beauchamp on 3/13/25. Please assist with surgical clearance at her already scheduled appointment on 2/24/25. Per Dr Beauchamp, please obtain a CBC, BMP, 12 Lead EKG & any other testing you deem necessary. Once completed, please just specify at the end of the clinic notes whether the patient is cleared or fax a report to our office at [phone]. If you have any questions, please don't hesitate to reach out to us. Thank you!    Clair Turner  Surgery Scheduler  [facility]  [phone] (Main Office)  [phone] (Direct Line)

## 2025-01-10 NOTE — TELEPHONE ENCOUNTER
Attempted to contact pt regarding labs and xray, unable to leave vm due to it not being set up     KS

## 2025-02-04 ENCOUNTER — HOSPITAL ENCOUNTER (OUTPATIENT)
Dept: RADIOLOGY | Facility: HOSPITAL | Age: 65
Discharge: HOME | End: 2025-02-04
Attending: INTERNAL MEDICINE
Payer: COMMERCIAL

## 2025-02-04 DIAGNOSIS — N81.4 UTERINE PROLAPSE: ICD-10-CM

## 2025-02-04 DIAGNOSIS — Z01.818 PRE-OP TESTING: ICD-10-CM

## 2025-02-04 PROCEDURE — 71046 X-RAY EXAM CHEST 2 VIEWS: CPT | Mod: TC,FY,PO

## 2025-02-04 PROCEDURE — 71046 X-RAY EXAM CHEST 2 VIEWS: CPT | Mod: 26,,, | Performed by: RADIOLOGY

## 2025-02-14 ENCOUNTER — TELEPHONE (OUTPATIENT)
Dept: UROGYNECOLOGY | Facility: CLINIC | Age: 65
End: 2025-02-14
Payer: COMMERCIAL

## 2025-02-14 NOTE — TELEPHONE ENCOUNTER
----- Message from Kate sent at 2/14/2025  2:22 PM CST -----  Regarding: surgery questions  Name of Who is Calling: Nenita           What is the request in detail: Patient is requesting a call back to find out if surgery will be laparoscopic.           Can the clinic reply by [facility]: Yes           [facility]  [phone]

## 2025-02-14 NOTE — TELEPHONE ENCOUNTER
Spoke to patient.  Let patient know that her surgery will be robotic.  Explained that she will have small incision on abdomen.  Patient verbalized understanding.  Call ended.

## 2025-02-18 ENCOUNTER — HOSPITAL ENCOUNTER (OUTPATIENT)
Dept: RADIOLOGY | Facility: HOSPITAL | Age: 65
Discharge: HOME | End: 2025-02-18
Attending: NURSE PRACTITIONER
Payer: COMMERCIAL

## 2025-02-18 DIAGNOSIS — N81.4 UTERINE PROLAPSE: ICD-10-CM

## 2025-02-18 PROCEDURE — 76856 US EXAM PELVIC COMPLETE: CPT | Mod: TC,PO

## 2025-02-19 LAB — BCS RECOMMENDATION EXT: NORMAL

## 2025-02-20 LAB — BMD RECOMMENDATION EXT: NORMAL

## 2025-02-24 ENCOUNTER — OFFICE VISIT (OUTPATIENT)
Dept: FAMILY MEDICINE | Facility: CLINIC | Age: 65
End: 2025-02-24
Payer: COMMERCIAL

## 2025-02-24 VITALS
WEIGHT: 144.31 LBS | SYSTOLIC BLOOD PRESSURE: 108 MMHG | BODY MASS INDEX: 25.57 KG/M2 | HEIGHT: 63 IN | DIASTOLIC BLOOD PRESSURE: 70 MMHG

## 2025-02-24 DIAGNOSIS — N81.4 UTERINE PROLAPSE: ICD-10-CM

## 2025-02-24 DIAGNOSIS — Z00.00 WELLNESS EXAMINATION: Primary | ICD-10-CM

## 2025-02-24 DIAGNOSIS — M85.89 OSTEOPENIA OF MULTIPLE SITES: ICD-10-CM

## 2025-02-24 DIAGNOSIS — Z01.818 PRE-OP EXAM: ICD-10-CM

## 2025-02-24 LAB
BCS RECOMMENDATION EXT: NORMAL
OHS QRS DURATION: 78 MS
OHS QTC CALCULATION: 407 MS

## 2025-02-24 PROCEDURE — 99999 PR PBB SHADOW E&M-EST. PATIENT-LVL III: CPT | Mod: PBBFAC,,, | Performed by: INTERNAL MEDICINE

## 2025-02-24 PROCEDURE — 93005 ELECTROCARDIOGRAM TRACING: CPT | Mod: S$GLB,,, | Performed by: INTERNAL MEDICINE

## 2025-02-24 PROCEDURE — 93010 ELECTROCARDIOGRAM REPORT: CPT | Mod: S$GLB,,, | Performed by: INTERNAL MEDICINE

## 2025-02-24 NOTE — PROGRESS NOTES
Subjective:       Patient ID: Nenita Armstrong is a 64 y.o. female.    Chief Complaint: Annual Exam (Annual/preop, pt is getting a hysterectomy  on 3/13/2025) and Pre-op Exam   HPI     History of Present Illness              Dexa - DIS results today.  Dr Oliveros     Gyn: Dr Oliveros   [facility]   Dexa: DIS Osetopenia 2022 - new one just completed    Colonoscopy:  2nd Dr Valladares 2022 normal   Immunizations: Flu: Tdap: 2016 Shingles: Y   Smoker:  never   Still working        Pre OP clearance // Urogynecology surgeon Barbara Beauchamp MD  Surgery date     Labs WBC white blood cell mild low range.  All other indices normal  chest x-ray reviewed normal  EKG today  Has preop visit [facility]     Uterine prolapse  - plan is repair 2025 [facility]     Stage 3 cystocele/uterine prolapse, stage 2 rectocele:   Surgical repair robotic hysterectomy, oophorectomy, sacral colpopexy, possible bladder sling               Here for clear request through PCP per PCP + labs (CBC, BMP, T&S)/EKG    Patient denies chest pain palpitation  Not taking any anti-inflammatories aspirin fish oil or vitamin-E.        ____________________________________________________________________________________________________  Assessment & Plan:  1. Wellness examination    2. Uterine prolapse  - IN OFFICE EKG 12-LEAD (to Wesley)    3. Pre-op exam  - IN OFFICE EKG 12-LEAD (to Muse)    4. Osteopenia of multiple sites     Wellness examination    Uterine prolapse  Comments:  Low risk clearance approved upcoming surgery  Orders:  -     IN OFFICE EKG 12-LEAD (to Wesley)    Pre-op exam  Comments:  EKG today  Orders:  -     IN OFFICE EKG 12-LEAD (to Muse)    Osteopenia of multiple sites  Comments:  Will get updated testing from [facility]        Continue to work on maintain healthy weight, balanced diet. Avoid unhealthy habits: smoking/vaping, excessive alcohol intake.     Recommend diet exercise:  High protein, low fat, low carb diet -  "calories women under <1200 & men <1800, carbs <100 G, protein 50-60 G daily. Protein supplements to replace one meal.  Keep all beverages < 10 calories per serving. Keep snacks < 100 calories.   Recommend exercise 160 minutes per week, combo of cardio and weight/strength training.     Visit today included increased complexity associated with the care of the episodic problem addressed and managing the longitudinal care of the patient due to the serious and/or complex managed problem(s). Prolapse       Disclaimer: This note was partly generated using dictation software which may occasionally result in transcription errors  ____________________________________________________________________________________________________  Review of Systems:  Review of Systems      Bladder prolapse    Objective:     Wt Readings from Last 3 Encounters:   02/24/25 65.5 kg (144 lb 4.7 oz)   07/15/24 65.5 kg (144 lb 6.4 oz)   01/29/24 66.9 kg (147 lb 7.8 oz)     BP Readings from Last 3 Encounters:   02/24/25 108/70   07/15/24 120/82   01/29/24 116/74       Lab Results   Component Value Date    WBC 3.79 (L) 02/04/2025    HGB 13.7 02/04/2025    HCT 41.1 02/04/2025     02/04/2025     02/04/2025    K 4.7 02/04/2025     02/04/2025    ALT 15 02/04/2025    AST 21 02/04/2025    CO2 28 02/04/2025    CREATININE 0.7 02/04/2025    BUN 18 02/04/2025    GLU 97 02/04/2025      No results found for: "HGBA1C"   Lab Results   Component Value Date    TSH 2.100 01/12/2023     No results found for: "FREET4"  Lab Results   Component Value Date    LDLCALC 121.4 02/04/2025    LDLCALC 120 (H) 01/15/2024    LDLCALC 119 (H) 01/12/2023     Lab Results   Component Value Date    TRIG 53 02/04/2025    TRIG 50 01/15/2024    TRIG 50 01/12/2023            Physical Exam      Constitutional:       Appearance: Normal appearance.   HENT:      Head: Normocephalic and atraumatic.   Eyes:      Extraocular Movements: Extraocular movements intact.      Pupils: " Pupils are equal, round, and reactive to light.   Cardiovascular:      Rate and Rhythm: Normal rate.   Pulmonary:      Effort: Pulmonary effort is normal.   Neurological:      Mental Status: She is alert and oriented to person, place, and time.   Psychiatric:         Mood and Affect: Mood normal.     Medication List with Changes/Refills   Current Medications    CHOLECALCIFEROL, VITD3,/VIT K2 (VITAMIN D3-VITAMIN K2 ORAL)    Take 1 tablet by mouth once daily. 5000    ESTRADIOL (ESTRACE) 0.01 % (0.1 MG/GRAM) VAGINAL CREAM    0.5 grams with applicator or dime-sized amount with finger in vagina nightly x 2 weeks, then twice a week thereafter   Discontinued Medications    SCOPOLAMINE (TRANSDERM-SCOP) 1.3-1.5 MG (1 MG OVER 3 DAYS)    Place 1 patch onto the skin every 72 hours.

## 2025-02-25 ENCOUNTER — RESULTS FOLLOW-UP (OUTPATIENT)
Dept: FAMILY MEDICINE | Facility: CLINIC | Age: 65
End: 2025-02-25

## 2025-02-25 ENCOUNTER — TELEPHONE (OUTPATIENT)
Dept: FAMILY MEDICINE | Facility: CLINIC | Age: 65
End: 2025-02-25
Payer: COMMERCIAL

## 2025-02-25 ENCOUNTER — PATIENT OUTREACH (OUTPATIENT)
Dept: ADMINISTRATIVE | Facility: HOSPITAL | Age: 65
End: 2025-02-25
Payer: COMMERCIAL

## 2025-02-25 NOTE — TELEPHONE ENCOUNTER
----- Message from Lala sent at 2/24/2025  4:46 PM CST -----  Type: Needs Medical AdviceWho Called:  pt Best Call Back Number:[phone] Information: pt requesting call back in regards to her EKG results please advise

## 2025-02-26 ENCOUNTER — PATIENT OUTREACH (OUTPATIENT)
Dept: ADMINISTRATIVE | Facility: HOSPITAL | Age: 65
End: 2025-02-26
Payer: COMMERCIAL

## 2025-02-26 NOTE — TELEPHONE ENCOUNTER
Kimberly can we call her and get her into recheck EKG.  Think leads may have been in wrong spot chest.     Reviewed EKG and cardio interpretation results.     She has slight variation in T wave in few chest leads (not all of them) which could be normal variant or leads placements maybe off.       Let have her come in and re due EKG to make sure it wasn't lead placement issues.      If still seen we can get additional Cardiac imaging before upcoming surgery.

## 2025-02-26 NOTE — PROGRESS NOTES
Population Health Chart Review & Patient Outreach Details      Additional [facility] Health Notes:               Updates Requested / Reviewed:      Updated Care Coordination Note and Immunizations Reconciliation Completed or Queried: [place]         Health Maintenance Topics Overdue:      [facility] Score: 1     Hemoglobin A1c    Pneumonia Vaccine                  Health Maintenance Topic(s) Outreach Outcomes & Actions Taken:    Breast Cancer Screening - Outreach Outcomes & Actions Taken  : External Records Uploaded & Care Team Updated if Applicable    Osteoporosis Screening - Outreach Outcomes & Actions Taken  : External Records Uploaded, Care Team Updated, & History Updated if Applicable

## 2025-02-27 ENCOUNTER — CLINICAL SUPPORT (OUTPATIENT)
Dept: FAMILY MEDICINE | Facility: CLINIC | Age: 65
End: 2025-02-27
Payer: COMMERCIAL

## 2025-02-27 ENCOUNTER — TELEPHONE (OUTPATIENT)
Dept: FAMILY MEDICINE | Facility: CLINIC | Age: 65
End: 2025-02-27

## 2025-02-27 DIAGNOSIS — Z01.818 PRE-OP TESTING: Primary | ICD-10-CM

## 2025-02-27 DIAGNOSIS — R01.1 HEART MURMUR: Primary | ICD-10-CM

## 2025-03-03 LAB
OHS QRS DURATION: 82 MS
OHS QTC CALCULATION: 418 MS

## 2025-03-06 ENCOUNTER — HOSPITAL ENCOUNTER (OUTPATIENT)
Dept: PREADMISSION TESTING | Facility: OTHER | Age: 65
Discharge: HOME | End: 2025-03-06
Attending: OBSTETRICS & GYNECOLOGY
Payer: COMMERCIAL

## 2025-03-06 ENCOUNTER — PROCEDURE VISIT (OUTPATIENT)
Dept: UROGYNECOLOGY | Facility: CLINIC | Age: 65
End: 2025-03-06
Payer: COMMERCIAL

## 2025-03-06 ENCOUNTER — OFFICE VISIT (OUTPATIENT)
Dept: UROGYNECOLOGY | Facility: CLINIC | Age: 65
End: 2025-03-06
Payer: COMMERCIAL

## 2025-03-06 ENCOUNTER — ANESTHESIA EVENT (OUTPATIENT)
Dept: SURGERY | Facility: OTHER | Age: 65
End: 2025-03-06
Payer: COMMERCIAL

## 2025-03-06 VITALS
DIASTOLIC BLOOD PRESSURE: 75 MMHG | OXYGEN SATURATION: 100 % | TEMPERATURE: 99 F | BODY MASS INDEX: 24.8 KG/M2 | HEIGHT: 63 IN | SYSTOLIC BLOOD PRESSURE: 119 MMHG | WEIGHT: 140 LBS | HEART RATE: 77 BPM | RESPIRATION RATE: 18 BRPM

## 2025-03-06 VITALS
DIASTOLIC BLOOD PRESSURE: 66 MMHG | HEIGHT: 63 IN | WEIGHT: 140.63 LBS | SYSTOLIC BLOOD PRESSURE: 116 MMHG | BODY MASS INDEX: 24.92 KG/M2

## 2025-03-06 VITALS
DIASTOLIC BLOOD PRESSURE: 66 MMHG | HEART RATE: 77 BPM | WEIGHT: 140.63 LBS | SYSTOLIC BLOOD PRESSURE: 116 MMHG | BODY MASS INDEX: 24.92 KG/M2 | HEIGHT: 63 IN

## 2025-03-06 DIAGNOSIS — N81.11 CYSTOCELE, MIDLINE: ICD-10-CM

## 2025-03-06 DIAGNOSIS — N81.4 UTERINE PROLAPSE: ICD-10-CM

## 2025-03-06 DIAGNOSIS — Z01.818 PRE-OP TESTING: Primary | ICD-10-CM

## 2025-03-06 DIAGNOSIS — N95.2 VAGINAL ATROPHY: ICD-10-CM

## 2025-03-06 DIAGNOSIS — N39.41 URINARY INCONTINENCE, URGE: ICD-10-CM

## 2025-03-06 DIAGNOSIS — Z01.818 PREOP TESTING: ICD-10-CM

## 2025-03-06 DIAGNOSIS — N81.6 RECTOCELE, FEMALE: ICD-10-CM

## 2025-03-06 DIAGNOSIS — R94.31 ABNORMAL EKG: Primary | ICD-10-CM

## 2025-03-06 LAB
ABO + RH BLD: NORMAL
BILIRUB SERPL-MCNC: NORMAL MG/DL
BLD GP AB SCN CELLS X3 SERPL QL: NORMAL
BLOOD URINE, POC: NORMAL
CLARITY, POC UA: CLEAR
COLOR, POC UA: NORMAL
GLUCOSE UR QL STRIP: NORMAL
KETONES UR QL STRIP: NORMAL
LEUKOCYTE ESTERASE URINE, POC: NORMAL
NITRITE, POC UA: NORMAL
PH, POC UA: 7
PROTEIN, POC: NORMAL
SPECIFIC GRAVITY, POC UA: 1.01
SPECIMEN OUTDATE: NORMAL
UROBILINOGEN, POC UA: NORMAL

## 2025-03-06 PROCEDURE — 52000 CYSTOURETHROSCOPY: CPT | Mod: 59,S$GLB,, | Performed by: OBSTETRICS & GYNECOLOGY

## 2025-03-06 PROCEDURE — 51741 ELECTRO-UROFLOWMETRY FIRST: CPT | Mod: 51,S$GLB,, | Performed by: OBSTETRICS & GYNECOLOGY

## 2025-03-06 PROCEDURE — 81002 URINALYSIS NONAUTO W/O SCOPE: CPT | Mod: S$GLB,,, | Performed by: OBSTETRICS & GYNECOLOGY

## 2025-03-06 PROCEDURE — 51728 CYSTOMETROGRAM W/VP: CPT | Mod: S$GLB,,, | Performed by: OBSTETRICS & GYNECOLOGY

## 2025-03-06 PROCEDURE — 86901 BLOOD TYPING SEROLOGIC RH(D): CPT | Performed by: ANESTHESIOLOGY

## 2025-03-06 PROCEDURE — 99499 UNLISTED E&M SERVICE: CPT | Mod: S$GLB,,, | Performed by: NURSE PRACTITIONER

## 2025-03-06 PROCEDURE — 36415 COLL VENOUS BLD VENIPUNCTURE: CPT | Performed by: ANESTHESIOLOGY

## 2025-03-06 PROCEDURE — 99999 PR PBB SHADOW E&M-EST. PATIENT-LVL III: CPT | Mod: PBBFAC,,, | Performed by: NURSE PRACTITIONER

## 2025-03-06 PROCEDURE — 51797 INTRAABDOMINAL PRESSURE TEST: CPT | Mod: S$GLB,,, | Performed by: OBSTETRICS & GYNECOLOGY

## 2025-03-06 RX ORDER — SODIUM CHLORIDE, SODIUM LACTATE, POTASSIUM CHLORIDE, CALCIUM CHLORIDE 600; 310; 30; 20 MG/100ML; MG/100ML; MG/100ML; MG/100ML
INJECTION, SOLUTION INTRAVENOUS CONTINUOUS
OUTPATIENT
Start: 2025-03-06

## 2025-03-06 RX ORDER — LIDOCAINE HYDROCHLORIDE 20 MG/ML
JELLY TOPICAL ONCE
Status: COMPLETED | OUTPATIENT
Start: 2025-03-06 | End: 2025-03-06

## 2025-03-06 RX ORDER — CIPROFLOXACIN 500 MG/1
500 TABLET ORAL
Status: COMPLETED | OUTPATIENT
Start: 2025-03-06 | End: 2025-03-06

## 2025-03-06 RX ADMIN — CIPROFLOXACIN 500 MG: 500 TABLET ORAL at 02:03

## 2025-03-06 RX ADMIN — LIDOCAINE HYDROCHLORIDE 5 ML: 20 JELLY TOPICAL at 02:03

## 2025-03-06 NOTE — PROCEDURES
TITLE OF OPERATION:  Complex cystometry.  Complex uroflowmetry.  Electromyography with surface electrodes.  Pressure voiding flow study.  Abdominal pressure measurement.  Leak point pressure measurement.    INDICATIONS:  1)  UI:  (--) GAB. (+) UUI (occasionally, ann 1st AM).  (--) pads, +rare underwear changes.  Daytime frequency: Q 3 hours.  Nocturia: Yes: 1/night.   (--) dysuria,  (--) hematuria,  (--) frequent UTIs.  (--) complete bladder emptying. PV to help.      2)  POP:  Present. below introitus.  Symptoms:(+)  pressure, feels weird, has to PV.  (--) vaginal bleeding. (--) vaginal discharge. (--) sexually active.  (--) h/o dyspareunia.   has had some ED.  (--)  Vaginal dryness.  (--) vaginal estrogen use. Is supposed to use vaginal estrogen.   --POP-Q:  Aa +3; Ba +5; C +6; Ap +3; Bp +5; D -3.  Genital hiatus 4, perineal body 3, total vaginal length 10-11.     3)  BM:  (--) constipation/straining.  (--) chronic diarrhea. (--) hematochezia.  (--) fecal incontinence.  (--) fecal smearing/urgency.  (+) complete evacuation.    PREOPERATIVE DIAGNOSIS:  1. Urinary incontinence, urge    2. Uterine prolapse    3. Cystocele, midline    4. Rectocele, female    5. Vaginal atrophy        POSTOPERATIVE DIAGNOSIS:  1. Urinary incontinence, urge    2. Uterine prolapse    3. Cystocele, midline    4. Rectocele, female    5. Vaginal atrophy    6.     Concern for voiding dysfunction (Valsalva assist)  7.     Urodynamic stress incontinence    ANESTHESIA:  None.    SPECIMEN (BACTERIOLOGICAL, PATHOLOGICAL OR OTHER):  None.    PROSTHETIC DEVICE/IMPLANT:  None.    SURGEONS NARRATIVE:  A time out was performed in which the patient identity and procedure were confirmed.  Urodynamic evaluation was performed using a computerized system ([employer], [employer]  Uroflowmetry was performed on the patient in the sitting position without catheters in place.  Subsequent urodynamic testing was performed with the patient in the  lithotomy position at 45 degrees. Air charged catheters were used with sterile water as the infusion medium. Vesical and abdominal (rectal) pressures were measured, and detrusor pressure was calculated. EMG activity was recorded with surface electrodes. During filling, room temperature sterile water was infused at a rate of 30 cubic centimeters per minute. The patient was asked cough after instillation of each 100cc volume. Two Valsalva leak point pressures and two cough leak point pressures were performed with the catheters in place at 300 cubic centimeters and again at maximum capacity. Valsalva leak point pressure was defined as the difference between vesical pressure at which leakage was noted (visualized at the external urethral meatus) and the baseline vesical pressure. Following urodynamic testing, a pressure flow study was performed with the patient in the sitting position. Vesical and abdominal pressures were monitored and detrusor pressures were calculated. After the pressure flow study, the catheters were then removed. The patient tolerated the procedure well.     Urine dipstick: neg.    1.  VOIDING PHASE:      a.  Uroflowmetry:  Prolapse reduction: No  Voided volume:  138 mL   Voiding time:   59 seconds  Max flow:  13 mL/s  Avg flow:   5 mL/s   PVR:   5 mL    The overall configuration of this uroflow study was with insufficient volume for interpretation.      b.  Pressure flow:  Prolapse reduction: No  Voided volume:   465 mL  Voiding time:   84 seconds  Peak flow:  25 mL/s   Avg flow:  8 mL/s  Max det pressure:  75  cm H20  Det pressure at max flow: 47 cm H20  Void initiated by detrusor contraction followed by Valsalva.    Urethral relaxation (EMG):  present.    PVR (calculated):  0 mL    The overall configuration of this pressure flow study was prolonged with concern for voiding dysfunction (Valsalva assist).      2.  FILLING PHASE:  1st desire: 153 mL  Normal desire:  204 mL  Strong desire:  380  mL  Urgency:  413 mL  Compliance (calculated)  approx 150 mL/cm H20  EMG activity during filling:  stable  Detrusor contractions observed: No.      3.  URETHRAL FUNCTION/STORAGE PHASE:    a.  WITH prolapse reduction:  CLPP (150 mL): Positive  at  109 cm H20  VLPP (150 mL): Positive  at  52 cm H20   CLPP (300 mL): Positive  at  135 cm H20  VLPP (300 mL): Positive  at  62 cm H20     These findings are consistent with Positive urodynamic stress incontinence.    Assessment:  UF with insufficient volume for interpretation.   PF prolonged with concern for voiding dysfunction (Valsalva assist).  Compliance normal.  Max capacity 465.  DO (--).  GAB (+).      Plan:    1)  Stage 3 cystocele/uterine prolapse, stage 2 rectocele:  --discussed  --options:  observation vs PT vs pessary vs surgery              --if surgery: robotic-hysterectomy, removal tubes/ovaries, sacral colpopexy              --if surgery: pelvic US done: Hypoechoic apparently solid endometrial mass which could represent a submucosal fibroid or an endometrial mass. More proximally the endometrial stripe is not enlarged measuring 2-3 mm.               --if surgery: + GAB on UDS today--add MUS              --if surgery: cleared per PCP + labs (CBC, BMP, T&S)/EKG     2)  Urge incontinence, rare:  --Empty bladder every 2-3 hours.  Empty well: wait a minute, lean forward on toilet.    --Avoid dietary irritants (see sheet).  Keep diary x 3-5 days to determine your irritants.  --KEGELS: do 10 in AM and 10 in PM, holding each x 10 seconds.  When you feel urge to go, STOP, KEGEL, and when urge has passed, then go to bathroom.  Consider PT in future.    --URGE: consider medication. Takes 2-4 weeks to see if will have effect.  For dry mouth: get sour, sugar free lozenge or gum.       3)  Vaginal atrophy (dryness):    --continue vaginal estrogen:  --Use 0.5 grams of estrogen cream in vagina with applicator or dime-sized amount with finger (as far as can reach internally  and around bulge) nightly x 2 weeks, then twice a week thereafter.  You can also apply a dime-sized amount with your finger around the vaginal opening and inner lips at same frequency.                           --Vaginal estrogen may help to decrease pain related to dryness with intercourse and urinary symptoms (urgency/frequency/UTIs) around menopause.      4)  OR scheduled.  See preop note for further detail.   ------------------------------------------    Title of Operation:   Cystourethroscopy.     INDICATIONS:  As above    PREOPERATIVE DIAGNOSIS  As above    POSTOPERATIVE DIAGNOSIS:   As above    Anesthesia:   2% Xylocaine gel.    Specimen (Bacteriological, Pathological or other):   None.     Prosthetic Device/Implant:   None.     Surgeons Narrative:     After informed consent was obtained, the patient was placed in the lithotomy position. The urethral meatus was prepped with Betadine and 10 cubic centimeters of 2% Xylocaine gel were introduced into the urethra. A flexible cystourethroscope was introduced into the bladder. The bladder was distended with approximately 300 cubic centimeters of sterile water. A systematic survey was performed in which the bladder was surveyed using multiple sequential passes in a clockwise fashion from the bladder dome to the bladder base to the urethrovesical junction. The trigone and ureteral orifices were observed. The scope was then flipped back on itself, and the urethrovesical junction was viewed. A vaginal examining finger was then placed with pressure suburethrally at the urethrovesical junction as the telescope was withdrawn in order to perform positive pressure urethroscopy.  Standard maneuvers of cough, squeeze and Valsalva were performed. The telescope was then completely withdrawn.     Findings: Urethroscopy:  Normal.  Cystoscopy:  Normal bladder mucosa, bilateral ureteral flow was noted.     Assessment: Essentially normal cystourethroscopy.      Plan: The patient  will follow up with Dr. Beauchamp as scheduled.  See urodynamics note from 3-6-2025 for further plan details.

## 2025-03-06 NOTE — H&P (VIEW-ONLY)
Urogyn follow up  03/06/2025  .  [facility] - UROGYNECOLOGY  [address] [address]    Nenita Armstrong  [id number]  1960      Nenita Armstrong is a 64 y.o. here for a urogyn preop visit    Last HPI from 07/15/2024  1)  UI:  (--) GAB. (+) UUI (occasionally, ann 1st AM).  (--) pads, +rare underwear changes.  Daytime frequency: Q 3 hours.  Nocturia: Yes: 1/night.   (--) dysuria,  (--) hematuria,  (--) frequent UTIs.  (--) complete bladder emptying. PV to help.      2)  POP:  Present. below introitus.  Symptoms:(+)  pressure, feels weird, has to PV.  (--) vaginal bleeding. (--) vaginal discharge. (--) sexually active.  (--) h/o dyspareunia.   has had some ED.  (--)  Vaginal dryness.  (--) vaginal estrogen use. Is supposed to use vaginal estrogen.    POP-Q:  Aa +3; Ba +5; C +6; Ap +3; Bp +5; D -3.  Genital hiatus 4, perineal body 3, total vaginal length 10-11.   Pvr 50 mL    3)  BM:  (--) constipation/straining.  (--) chronic diarrhea. (--) hematochezia.  (--) fecal incontinence.  (--) fecal smearing/urgency.  (+) complete evacuation.     02/18/2024  Pelvic ultrasound  The uterus measures 7.8 x 2.9 x 4.2 cm.  The endometrial stripe measures 2 mm on the midline sagittal image.  Neither ovary is seen despite transabdominal transvaginal technique.  His there is a hypoechoic mass centered in the lower endometrial region measuring 10 x 7 x 7 mm.  Whether this relates to submucosal fibroid or his endometrial mass is uncertain.  Is the   Impression:   Hypoechoic apparently solid endometrial mass which could represent a submucosal fibroid or an endometrial mass.  More proximally the endometrial stripe is not enlarged measuring 2-3 mm.    Changes from last visit:  Voiding every 2 hours during the day and denies nocturia  Rare UUI in early am  Denies constipation or straining    03/06/2025  Suds  1.  VOIDING PHASE:       a.  Uroflowmetry:  Prolapse reduction: No  Voided volume:  138  mL   Voiding time:   59 seconds  Max flow:  13 mL/s  Avg flow:   5 mL/s   PVR:   5 mL     The overall configuration of this uroflow study was with insufficient volume for interpretation.       b.  Pressure flow:  Prolapse reduction: No  Voided volume:   465 mL  Voiding time:   84 seconds  Peak flow:  25 mL/s   Avg flow:  8 mL/s  Max det pressure:  75  cm H20  Det pressure at max flow: 47 cm H20  Void initiated by detrusor contraction followed by Valsalva.    Urethral relaxation (EMG):  present.    PVR (calculated):  0 mL     The overall configuration of this pressure flow study was prolonged with concern for voiding dysfunction (Valsalva assist).       2.  FILLING PHASE:  1st desire: 153 mL  Normal desire:  204 mL  Strong desire:  380 mL  Urgency:  413 mL  Compliance (calculated)  approx 150 mL/cm H20  EMG activity during filling:  stable  Detrusor contractions observed: No.       3.  URETHRAL FUNCTION/STORAGE PHASE:     a.  WITH prolapse reduction:  CLPP (150 mL): Positive  at  109 cm H20  VLPP (150 mL): Positive  at  52 cm H20   CLPP (300 mL): Positive  at  135 cm H20  VLPP (300 mL): Positive  at  62 cm H20      These findings are consistent with Positive urodynamic stress incontinence.     Assessment:  UF with insufficient volume for interpretation.   PF prolonged with concern for voiding dysfunction (Valsalva assist).  Compliance normal.  Max capacity 465.  DO (--).  GAB (+).      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     Social Drivers of Health     Financial Resource Strain: Low Risk  (1/27/2024)    Overall Financial Resource Strain (CARDIA)     Difficulty of Paying Living Expenses: Not hard at all   Food Insecurity: No Food Insecurity (1/27/2024)    Hunger Vital Sign     Worried About Running Out of Food in the Last Year: Never true     Ran Out of Food in the Last Year: Never true   Transportation Needs: No Transportation Needs (1/27/2024)    PRAPARE - Transportation     Lack of Transportation (Medical): No     Lack of Transportation (Non-Medical): No   Physical Activity: Insufficiently Active (1/27/2024)    Exercise Vital Sign     Days of Exercise per Week: 2 days     Minutes of Exercise per Session: 20 min   Stress: No Stress Concern Present (1/27/2024)    [employer] [place] [employer] - Occupational Stress Questionnaire     Feeling of Stress : Not at all   Housing Stability: Low Risk  (1/27/2024)    Housing Stability Vital Sign     Unable to Pay for Housing in the Last Year: No     Number of Places Lived in the Last Year: 1     Unstable Housing in the Last Year: No       Current Medications[1]    Review of patient's allergies indicates:  No Known Allergies    Well woman:  Pap test: 2022 normal/HPV neg.  History of abnormal paps: No.  History of STIs:  No  Mammogram: Date of last: 2023.  Result: Normal per report  Colonoscopy: Date of last: 2022.  Result:  normal.  Repeat due:  2032.    DEXA:  Date of last: 2022.  Result:  osteopenia, FRAX not sig elevated--taking D.  Repeat due: per  "GYN/PCP.    ROS:  As per HPI.      Exam  /66 (BP Location: Right arm, Patient Position: Sitting)   Pulse 77   Ht 5' 3" (1.6 m)   Wt 63.8 kg (140 lb 10.5 oz)   BMI 24.92 kg/m²   General: alert and oriented, no acute distress  Respiratory: normal respiratory effort  Abd: soft, non-tender, non-distended    Pelvic--deferred    Impression  1. Abnormal EKG  Ambulatory referral/consult to Cardiology      2. Preop testing  Ambulatory referral/consult to Cardiology      3. Urinary incontinence, urge        4. Uterine prolapse        5. Cystocele, midline        6. Rectocele, female        7. Vaginal atrophy          We reviewed the above issues and discussed options for short-term versus long-term management of her problems.   Plan:   Patient consented for robotic assisted laparoscopic hysterectomy and sacrocolpopexy with synthetic mesh, removal of tubes and ovaries, possible anterior/posterior repair with perineorrhaphy, possible laparotomy, placement of synthetic midurethral sling, and cystourethroscopy.   R/B/A reviewed. Specific risks reviewed include:  infection, bleeding, need for blood transfusion, damage to surrounding structures, anesthesia risks, death, heart attack, stroke, mesh erosion/extrusion, pain, dyspareunia, urinary retention, voiding dysfunction, urinary incontinence, exacerbation of urinary urge incontinence, and need for further surgeries.  We reviewed potential for failure of POP defect repair and need for future surgery, with no way of predicting risk.  She understands success rate of ASC approaches 85%.  Success rate of midurethral sling for GAB was reviewed as 80-85%, and she understands that this will not necessarily impact other types of urinary incontinence.  Alternatives reviewed include: pessary/PT for POP and pessary/periurethral injections/PT/medication for GAB.    T&S, urine HCG on DOS  Preoperative appointment with PCP or cardiology: Yes - pending  VTE Prophylaxis:  heparin 5000 u " SQ TID (1st dose 2hrs preop) + SCDs  Patient instructed on Magnesium citrate and chlorahexadine/dial soap prep to perform day before & AM of surgery.   Proceed to OR for above-mentioned procedure.        20 minutes were spent in face to face time with this patient  100 % of this time was spent in counseling and/or coordination of care    JUANJOSE Schwab-BC [facility]  Division of Female Pelvic Medicine and Reconstructive Surgery  Department of Obstetrics & Gynecology         [1]   Current Outpatient Medications   Medication Sig Dispense Refill    cholecalciferol, vitD3,/vit K2 (VITAMIN D3-VITAMIN K2 ORAL) Take 1 tablet by mouth once daily. 5000      estradioL (ESTRACE) 0.01 % (0.1 mg/gram) vaginal cream 0.5 grams with applicator or dime-sized amount with finger in vagina nightly x 2 weeks, then twice a week thereafter (Patient not taking: Reported on 3/6/2025) 42.5 g 11     No current facility-administered medications for this visit.

## 2025-03-06 NOTE — DISCHARGE INSTRUCTIONS
Information to Prepare you for your Surgery    PRE-ADMIT TESTING & SURGERY  [address] [facility] [address]  [address]       Your surgery has been scheduled at [facility]. We are pleased to have the opportunity to serve you. For Further Information please call [phone].    On the day of surgery please report to the Registration Desk on the 1st floor of the [facility]. This is a 4 story red brick building on the corner of [facility]. Turn onto [address]  to access the parking lot behind the [facility] at the corner of [facility].    CONTACT YOUR PHYSICIAN'S OFFICE THE DAY PRIOR TO YOUR SURGERY TO OBTAIN YOUR ARRIVAL TIME.     The evening before surgery do not eat anything after 9 p.m. ( this includes hard candy, chewing gum and mints).  You may only have GATORADE, POWERADE AND WATER  from 9 p.m. until you leave your home.   DO NOT DRINK ANY LIQUIDS ON THE WAY TO THE HOSPITAL.      Why does your anesthesiologist allow you to drink Gatorade/Powerade before surgery?  Gatorade/Powerade helps to increase your comfort before surgery and to decrease your nausea after surgery.   The carbohydrates in Gatorade/Powerade help reduce your body's stress response to surgery.    If you are a diabetic-drink only water prior to surgery.    Outpatient Surgery- May allow 2 adults (18 and older)/ Support Persons (1 being the designated ) for all surgical/procedural patients. A breastfeeding mother will be allowed her infant and 2 adult Support Persons. No one under the age of 18 will be allowed in the building.    MEDICATION INSTRUCTIONS:     Surgery Patients:  If you take ASPIRIN - Your PHYSICIAN/SURGEON will need to inform you IF/OR when you need to stop taking aspirin prior to your surgery.     Starting the week prior to surgery, do not take any medications containing IBUPROFEN or NSAIDS (Advil, Aleve, BC, Goody's, Ketorolac, Meloxicam, Mobic, Motrin, Naproxen,  Toradol, etc).  If you are not sure if you should take a medicine, please call your surgeon's office.  You may take Tylenol.    Do Not Wear any make-up (especially eye make-up) to surgery. Please remove any false eyelashes or eyelash extensions. If you arrive the day of surgery with makeup/eyelashes on you will be required to remove prior to surgery. (There is a risk of corneal abrasions if eye makeup/eyelash extensions are not removed)    Leave all valuables at home.   Do Not wear any jewelry or watches, including any metal in body piercings. Jewelry must be removed prior to coming to the hospital.  There is a possibility that rings that are unable to be removed may be cut off if they are on the surgical extremity.    Please remove all hair extensions, wigs, clips and any other metal accessories/ ornaments from your hair.  These items may pose a flammable/fire risk in Surgery and must be removed.    Do not shave your surgical area at least 5 days prior to your surgery. The surgical prep will be performed at the hospital according to Infection Control regulations.    Contact Lens must be removed before surgery. Either do not wear the contact lens or bring a case and solution for storage.  Please bring a container for eyeglasses or dentures as required.  Bring any paperwork your physician has provided, such as consent forms,  history and physicals, doctor's orders, etc.   Bring comfortable clothes that are loose fitting to wear upon discharge. Take into consideration the type of surgery being performed.  Maintain your diet as advised per your physician the day prior to surgery.    Adequate rest the night before surgery is advised.   Park in the Parking lot behind the hospital or in the [facility] across the street from the parking lot. Parking is complimentary.  If you will be discharged the same day as your procedure, please arrange for a responsible adult to drive you home or to accompany you if  traveling by taxi.   YOU WILL NOT BE PERMITTED TO DRIVE OR TO LEAVE THE HOSPITAL ALONE AFTER SURGERY.   If you are being discharged the same day, it is strongly recommended that you arrange for someone to remain with you for the first 24 hrs following your surgery.    The Surgeon will speak to your family/visitor after your surgery regarding the outcome of your surgery and post op care.  The Surgeon may speak to you after your surgery, but there is a possibility you may not remember the details.  Please check with your family members regarding the conversation with the Surgeon.    We strongly recommend whoever is bringing you home be present for discharge instructions.  This will ensure a thorough understanding for your post op home care.    If the patient has fever, cough, or signs/symptoms of Flu or Covid please do not come in for your surgery.   Contact your surgeon and your primary care physician for further instructions.               Bathing Instructions  Shower (no bath) the evening before and the morning of your procedure with antibacterial soap.  Wash your face with water and your regular face wash/soap  Use your regular shampoo  Dry off as usual Do not use any deodorant, powder, body lotions, perfume, after shave or cologne.     Thanks,  JESSICA Luna

## 2025-03-06 NOTE — ANESTHESIA PREPROCEDURE EVALUATION
03/06/2025  Nenita Armstrong is a 64 y.o., female.      Pre-op Assessment    I have reviewed the Patient Summary Reports.     I have reviewed the Nursing Notes. I have reviewed the NPO Status.   I have reviewed the Medications.     Review of Systems  Anesthesia Hx:  No problems with previous Anesthesia             Denies Family Hx of Anesthesia complications.    Denies Personal Hx of Anesthesia complications.                    Social:  Non-Smoker       Hematology/Oncology:  Hematology Normal   Oncology Normal                                   EENT/Dental:  EENT/Dental Normal           Cardiovascular:  Cardiovascular Normal Exercise tolerance: good                                             Pulmonary:  Pulmonary Normal                       Renal/:   renal calculi             Other Renal / Gu Conditions:   Musculoskeletal:  Musculoskeletal Normal                Neurological:  Neurology Normal                                      Endocrine:  Endocrine Normal            Dermatological:  Skin Normal    Psych:  Psychiatric Normal                    Physical Exam  General: Well nourished, Cooperative, Oriented and Alert    Airway:  Mallampati: II / II  Mouth Opening: Normal  TM Distance: Normal  Neck ROM: Normal ROM    Dental:  Intact        Anesthesia Plan  Type of Anesthesia, risks & benefits discussed:    Anesthesia Type: Gen ETT  Intra-op Monitoring Plan: Standard ASA Monitors  Post Op Pain Control Plan: multimodal analgesia  Induction:  IV  Airway Plan: Video and Direct  Informed Consent: Informed consent signed with the Patient and all parties understand the risks and agree with anesthesia plan.  All questions answered.   ASA Score: 1  Day of Surgery Review of History & Physical: H&P Update referred to the surgeon/provider.  Anesthesia Plan Notes: Labs in Epic.  T/S    Ready For Surgery From  Anesthesia Perspective.     .

## 2025-03-06 NOTE — PROGRESS NOTES
Urogyn follow up  03/06/2025  .  [facility] - UROGYNECOLOGY  [address] [address]    Nenita Armstrong  [id number]  1960      Nenita Armstrong is a 64 y.o. here for a urogyn preop visit    Last HPI from 07/15/2024  1)  UI:  (--) GAB. (+) UUI (occasionally, ann 1st AM).  (--) pads, +rare underwear changes.  Daytime frequency: Q 3 hours.  Nocturia: Yes: 1/night.   (--) dysuria,  (--) hematuria,  (--) frequent UTIs.  (--) complete bladder emptying. PV to help.      2)  POP:  Present. below introitus.  Symptoms:(+)  pressure, feels weird, has to PV.  (--) vaginal bleeding. (--) vaginal discharge. (--) sexually active.  (--) h/o dyspareunia.   has had some ED.  (--)  Vaginal dryness.  (--) vaginal estrogen use. Is supposed to use vaginal estrogen.    POP-Q:  Aa +3; Ba +5; C +6; Ap +3; Bp +5; D -3.  Genital hiatus 4, perineal body 3, total vaginal length 10-11.   Pvr 50 mL    3)  BM:  (--) constipation/straining.  (--) chronic diarrhea. (--) hematochezia.  (--) fecal incontinence.  (--) fecal smearing/urgency.  (+) complete evacuation.     02/18/2024  Pelvic ultrasound  The uterus measures 7.8 x 2.9 x 4.2 cm.  The endometrial stripe measures 2 mm on the midline sagittal image.  Neither ovary is seen despite transabdominal transvaginal technique.  His there is a hypoechoic mass centered in the lower endometrial region measuring 10 x 7 x 7 mm.  Whether this relates to submucosal fibroid or his endometrial mass is uncertain.  Is the   Impression:   Hypoechoic apparently solid endometrial mass which could represent a submucosal fibroid or an endometrial mass.  More proximally the endometrial stripe is not enlarged measuring 2-3 mm.    Changes from last visit:  Voiding every 2 hours during the day and denies nocturia  Rare UUI in early am  Denies constipation or straining    03/06/2025  Suds  1.  VOIDING PHASE:       a.  Uroflowmetry:  Prolapse reduction: No  Voided volume:  138  mL   Voiding time:   59 seconds  Max flow:  13 mL/s  Avg flow:   5 mL/s   PVR:   5 mL     The overall configuration of this uroflow study was with insufficient volume for interpretation.       b.  Pressure flow:  Prolapse reduction: No  Voided volume:   465 mL  Voiding time:   84 seconds  Peak flow:  25 mL/s   Avg flow:  8 mL/s  Max det pressure:  75  cm H20  Det pressure at max flow: 47 cm H20  Void initiated by detrusor contraction followed by Valsalva.    Urethral relaxation (EMG):  present.    PVR (calculated):  0 mL     The overall configuration of this pressure flow study was prolonged with concern for voiding dysfunction (Valsalva assist).       2.  FILLING PHASE:  1st desire: 153 mL  Normal desire:  204 mL  Strong desire:  380 mL  Urgency:  413 mL  Compliance (calculated)  approx 150 mL/cm H20  EMG activity during filling:  stable  Detrusor contractions observed: No.       3.  URETHRAL FUNCTION/STORAGE PHASE:     a.  WITH prolapse reduction:  CLPP (150 mL): Positive  at  109 cm H20  VLPP (150 mL): Positive  at  52 cm H20   CLPP (300 mL): Positive  at  135 cm H20  VLPP (300 mL): Positive  at  62 cm H20      These findings are consistent with Positive urodynamic stress incontinence.     Assessment:  UF with insufficient volume for interpretation.   PF prolonged with concern for voiding dysfunction (Valsalva assist).  Compliance normal.  Max capacity 465.  DO (--).  GAB (+).      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     Social Drivers of Health     Financial Resource Strain: Low Risk  (1/27/2024)    Overall Financial Resource Strain (CARDIA)     Difficulty of Paying Living Expenses: Not hard at all   Food Insecurity: No Food Insecurity (1/27/2024)    Hunger Vital Sign     Worried About Running Out of Food in the Last Year: Never true     Ran Out of Food in the Last Year: Never true   Transportation Needs: No Transportation Needs (1/27/2024)    PRAPARE - Transportation     Lack of Transportation (Medical): No     Lack of Transportation (Non-Medical): No   Physical Activity: Insufficiently Active (1/27/2024)    Exercise Vital Sign     Days of Exercise per Week: 2 days     Minutes of Exercise per Session: 20 min   Stress: No Stress Concern Present (1/27/2024)    [employer] - Occupational Stress Questionnaire     Feeling of Stress : Not at all   Housing Stability: Low Risk  (1/27/2024)    Housing Stability Vital Sign     Unable to Pay for Housing in the Last Year: No     Number of Places Lived in the Last Year: 1     Unstable Housing in the Last Year: No       Current Medications[1]    Review of patient's allergies indicates:  No Known Allergies    Well woman:  Pap test: 2022 normal/HPV neg.  History of abnormal paps: No.  History of STIs:  No  Mammogram: Date of last: 2023.  Result: Normal per report  Colonoscopy: Date of last: 2022.  Result:  normal.  Repeat due:  2032.    DEXA:  Date of last: 2022.  Result:  osteopenia, FRAX not sig elevated--taking D.  Repeat due: per  "GYN/PCP.    ROS:  As per HPI.      Exam  /66 (BP Location: Right arm, Patient Position: Sitting)   Pulse 77   Ht 5' 3" (1.6 m)   Wt 63.8 kg (140 lb 10.5 oz)   BMI 24.92 kg/m²   General: alert and oriented, no acute distress  Respiratory: normal respiratory effort  Abd: soft, non-tender, non-distended    Pelvic--deferred    Impression  1. Abnormal EKG  Ambulatory referral/consult to Cardiology      2. Preop testing  Ambulatory referral/consult to Cardiology      3. Urinary incontinence, urge        4. Uterine prolapse        5. Cystocele, midline        6. Rectocele, female        7. Vaginal atrophy          We reviewed the above issues and discussed options for short-term versus long-term management of her problems.   Plan:   Patient consented for robotic assisted laparoscopic hysterectomy and sacrocolpopexy with synthetic mesh, removal of tubes and ovaries, possible anterior/posterior repair with perineorrhaphy, possible laparotomy, placement of synthetic midurethral sling, and cystourethroscopy.   R/B/A reviewed. Specific risks reviewed include:  infection, bleeding, need for blood transfusion, damage to surrounding structures, anesthesia risks, death, heart attack, stroke, mesh erosion/extrusion, pain, dyspareunia, urinary retention, voiding dysfunction, urinary incontinence, exacerbation of urinary urge incontinence, and need for further surgeries.  We reviewed potential for failure of POP defect repair and need for future surgery, with no way of predicting risk.  She understands success rate of ASC approaches 85%.  Success rate of midurethral sling for GAB was reviewed as 80-85%, and she understands that this will not necessarily impact other types of urinary incontinence.  Alternatives reviewed include: pessary/PT for POP and pessary/periurethral injections/PT/medication for GAB.    T&S, urine HCG on DOS  Preoperative appointment with PCP or cardiology: Yes - pending  VTE Prophylaxis:  heparin 5000 u " SQ TID (1st dose 2hrs preop) + SCDs  Patient instructed on Magnesium citrate and chlorahexadine/dial soap prep to perform day before & AM of surgery.   Proceed to OR for above-mentioned procedure.        20 minutes were spent in face to face time with this patient  100 % of this time was spent in counseling and/or coordination of care    JUANJOSE Schwab-BC [facility]  Division of Female Pelvic Medicine and Reconstructive Surgery  Department of Obstetrics & Gynecology         [1]   Current Outpatient Medications   Medication Sig Dispense Refill    cholecalciferol, vitD3,/vit K2 (VITAMIN D3-VITAMIN K2 ORAL) Take 1 tablet by mouth once daily. 5000      estradioL (ESTRACE) 0.01 % (0.1 mg/gram) vaginal cream 0.5 grams with applicator or dime-sized amount with finger in vagina nightly x 2 weeks, then twice a week thereafter (Patient not taking: Reported on 3/6/2025) 42.5 g 11     No current facility-administered medications for this visit.

## 2025-03-07 DIAGNOSIS — Z01.818 PRE-OP EVALUATION: Primary | ICD-10-CM

## 2025-03-10 ENCOUNTER — OFFICE VISIT (OUTPATIENT)
Dept: CARDIOLOGY | Facility: CLINIC | Age: 65
End: 2025-03-10
Payer: COMMERCIAL

## 2025-03-10 ENCOUNTER — HOSPITAL ENCOUNTER (OUTPATIENT)
Dept: CARDIOLOGY | Facility: HOSPITAL | Age: 65
Discharge: HOME | End: 2025-03-10
Attending: INTERNAL MEDICINE
Payer: COMMERCIAL

## 2025-03-10 ENCOUNTER — TELEPHONE (OUTPATIENT)
Dept: UROGYNECOLOGY | Facility: CLINIC | Age: 65
End: 2025-03-10
Payer: COMMERCIAL

## 2025-03-10 VITALS — HEIGHT: 63 IN | WEIGHT: 139 LBS | BODY MASS INDEX: 24.63 KG/M2

## 2025-03-10 VITALS
DIASTOLIC BLOOD PRESSURE: 77 MMHG | HEART RATE: 85 BPM | WEIGHT: 139.75 LBS | SYSTOLIC BLOOD PRESSURE: 118 MMHG | BODY MASS INDEX: 24.76 KG/M2 | HEIGHT: 63 IN

## 2025-03-10 DIAGNOSIS — Z01.818 PREOP TESTING: ICD-10-CM

## 2025-03-10 DIAGNOSIS — N81.4 UTERINE PROLAPSE: ICD-10-CM

## 2025-03-10 DIAGNOSIS — R94.31 ABNORMAL EKG: ICD-10-CM

## 2025-03-10 DIAGNOSIS — N39.41 URINARY INCONTINENCE, URGE: ICD-10-CM

## 2025-03-10 DIAGNOSIS — Z01.818 PRE-OP EVALUATION: ICD-10-CM

## 2025-03-10 DIAGNOSIS — R94.31 ABNORMAL ECG: Primary | ICD-10-CM

## 2025-03-10 LAB
ASCENDING AORTA: 3.02 CM
AV INDEX (PROSTH): 0.85
AV MEAN GRADIENT: 5 MMHG
AV PEAK GRADIENT: 9 MMHG
AV VALVE AREA BY VELOCITY RATIO: 2.2 CM²
AV VALVE AREA: 2.2 CM²
AV VELOCITY RATIO: 0.87
BSA FOR ECHO PROCEDURE: 1.67 M2
CV ECHO LV RWT: 0.36 CM
CV STRESS BASE HR: 77 BPM
DIASTOLIC BLOOD PRESSURE: 72 MMHG
DOP CALC AO PEAK VEL: 1.5 M/S
DOP CALC AO VTI: 30.9 CM
DOP CALC LVOT AREA: 2.5 CM2
DOP CALC LVOT DIAMETER: 1.8 CM
DOP CALC LVOT PEAK VEL: 1.3 M/S
DOP CALC LVOT STROKE VOLUME: 66.9 CM3
DOP CALCLVOT PEAK VEL VTI: 26.3 CM
E WAVE DECELERATION TIME: 254 MSEC
E/A RATIO: 0.91
E/E' RATIO: 9 M/S
ECHO LV POSTERIOR WALL: 0.8 CM (ref 0.6–1.1)
FRACTIONAL SHORTENING: 29.5 % (ref 28–44)
INTERVENTRICULAR SEPTUM: 0.7 CM (ref 0.6–1.1)
IVRT: 84 MSEC
LEFT ATRIUM AREA SYSTOLIC (APICAL 2 CHAMBER): 13.75 CM2
LEFT ATRIUM AREA SYSTOLIC (APICAL 4 CHAMBER): 13.27 CM2
LEFT ATRIUM SIZE: 2.7 CM
LEFT ATRIUM VOLUME INDEX MOD: 20 ML/M2
LEFT ATRIUM VOLUME MOD: 34 ML
LEFT INTERNAL DIMENSION IN SYSTOLE: 3.1 CM (ref 2.1–4)
LEFT VENTRICLE DIASTOLIC VOLUME INDEX: 54.22 ML/M2
LEFT VENTRICLE DIASTOLIC VOLUME: 90 ML
LEFT VENTRICLE END SYSTOLIC VOLUME APICAL 2 CHAMBER: 33.43 ML
LEFT VENTRICLE END SYSTOLIC VOLUME APICAL 4 CHAMBER: 33.69 ML
LEFT VENTRICLE MASS INDEX: 60.6 G/M2
LEFT VENTRICLE SYSTOLIC VOLUME INDEX: 22.9 ML/M2
LEFT VENTRICLE SYSTOLIC VOLUME: 38 ML
LEFT VENTRICULAR INTERNAL DIMENSION IN DIASTOLE: 4.4 CM (ref 3.5–6)
LEFT VENTRICULAR MASS: 100.6 G
LV LATERAL E/E' RATIO: 7.3 M/S
LV SEPTAL E/E' RATIO: 10.4 M/S
LVED V (TEICH): 89.63 ML
LVES V (TEICH): 37.52 ML
LVOT MG: 3.05 MMHG
LVOT MV: 0.79 CM/S
MV PEAK A VEL: 0.8 M/S
MV PEAK E VEL: 0.73 M/S
MV STENOSIS PRESSURE HALF TIME: 73.73 MS
MV VALVE AREA P 1/2 METHOD: 2.98 CM2
OHS CV CPX 1 MINUTE RECOVERY HEART RATE: 121 BPM
OHS CV CPX 85 PERCENT MAX PREDICTED HEART RATE MALE: 133
OHS CV CPX ESTIMATED METS: 11
OHS CV CPX MAX PREDICTED HEART RATE: 156
OHS CV CPX PATIENT IS FEMALE: 1
OHS CV CPX PATIENT IS MALE: 0
OHS CV CPX PEAK DIASTOLIC BLOOD PRESSURE: 64 MMHG
OHS CV CPX PEAK HEAR RATE: 155 BPM
OHS CV CPX PEAK RATE PRESSURE PRODUCT: NORMAL
OHS CV CPX PEAK SYSTOLIC BLOOD PRESSURE: 131 MMHG
OHS CV CPX PERCENT MAX PREDICTED HEART RATE ACHIEVED: 104
OHS CV CPX RATE PRESSURE PRODUCT PRESENTING: 8778
PISA TR MAX VEL: 2.7 M/S
PULM VEIN S/D RATIO: 1.39
PV PEAK D VEL: 0.51 M/S
PV PEAK S VEL: 0.71 M/S
RA PRESSURE ESTIMATED: 3 MMHG
RV TB RVSP: 6 MMHG
SINUS: 2.98 CM
STJ: 2.48 CM
STRESS ECHO POST EXERCISE DUR MIN: 6 MINUTES
STRESS ECHO POST EXERCISE DUR SEC: 9 SECONDS
STRESS ST DEPRESSION: 0.5 MM
SYSTOLIC BLOOD PRESSURE: 114 MMHG
TDI LATERAL: 0.1 M/S
TDI SEPTAL: 0.07 M/S
TDI: 0.09 M/S
TR MAX PG: 29 MMHG
TRICUSPID ANNULAR PLANE SYSTOLIC EXCURSION: 2.54 CM
TV REST PULMONARY ARTERY PRESSURE: 32 MMHG
Z-SCORE OF LEFT VENTRICULAR DIMENSION IN END DIASTOLE: -0.55
Z-SCORE OF LEFT VENTRICULAR DIMENSION IN END SYSTOLE: 0.58

## 2025-03-10 PROCEDURE — 99205 OFFICE O/P NEW HI 60 MIN: CPT | Mod: S$GLB,,, | Performed by: INTERNAL MEDICINE

## 2025-03-10 PROCEDURE — 93351 STRESS TTE COMPLETE: CPT | Mod: PO

## 2025-03-10 PROCEDURE — 3078F DIAST BP <80 MM HG: CPT | Mod: CPTII,S$GLB,, | Performed by: INTERNAL MEDICINE

## 2025-03-10 PROCEDURE — 3008F BODY MASS INDEX DOCD: CPT | Mod: CPTII,S$GLB,, | Performed by: INTERNAL MEDICINE

## 2025-03-10 PROCEDURE — 99999 PR PBB SHADOW E&M-EST. PATIENT-LVL III: CPT | Mod: PBBFAC,,, | Performed by: INTERNAL MEDICINE

## 2025-03-10 PROCEDURE — 3074F SYST BP LT 130 MM HG: CPT | Mod: CPTII,S$GLB,, | Performed by: INTERNAL MEDICINE

## 2025-03-10 PROCEDURE — 1159F MED LIST DOCD IN RCRD: CPT | Mod: CPTII,S$GLB,, | Performed by: INTERNAL MEDICINE

## 2025-03-10 PROCEDURE — 93005 ELECTROCARDIOGRAM TRACING: CPT | Mod: PO

## 2025-03-10 PROCEDURE — 93351 STRESS TTE COMPLETE: CPT | Mod: 26,,, | Performed by: INTERNAL MEDICINE

## 2025-03-10 NOTE — PROGRESS NOTES
SUBJECTIVE:    Patient ID:  Nenita Armstrong is a 64 y.o. female who was referred to cardiology clinic as a new patient for evaluation of abnormal ECG.    Medical history:  Uterine prolapse  Urinary incontinence    Ms. Armstrong is scheduled to undergo a robotic assisted laparoscopic hysterectomy and sacrocolpopexy with synthetic mesh, removal of tubes and ovaries, possible anterior/posterior repair with perineorrhaphy, possible laparotomy, placement of synthetic midurethral sling, and cystourethroscopy. Pre-operative workup involved an ECG which showed nonspecific ST-T changes in the anterolateral precordium and inferior leads. No previous cardiac history.    All ECGs in EMR personally reviewed which show sinus rhythm with nonspecific ST-T changes in the anterolateral and inferior leads  All outside records reviewed    Father with pacemaker  Mother passed away     Relevant labs: cr 0.7, TSH 2.1,   Relevant cardiac meds: estradiol    I personally reviewed the ECG/telemetry strip today. My interpretation is NSR with nonspecific ST-T changes.     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     Social Drivers of Health     Financial Resource Strain: Low Risk  (3/9/2025)    Overall Financial Resource Strain (CARDIA)     Difficulty of Paying Living Expenses: Not hard at all   Food Insecurity: No Food Insecurity (3/9/2025)    Hunger Vital Sign     Worried About Running Out of Food in the Last Year: Never true     Ran Out of Food in the Last Year: Never true   Transportation Needs: No Transportation Needs (3/9/2025)    PRAPARE - Transportation     Lack of Transportation (Medical): No     Lack of Transportation (Non-Medical): No   Physical Activity: Unknown (3/9/2025)    Exercise Vital Sign     Days of Exercise per Week: 0 days     Minutes of Exercise per Session: Patient declined   Stress: No Stress Concern Present (3/9/2025)    [employer] - Occupational Stress Questionnaire     Feeling of Stress : Not at all   Housing Stability: Low Risk  (3/9/2025)    Housing Stability Vital Sign     Unable to Pay for Housing in the Last Year: No     Number of Times Moved in the Last Year: 1     Homeless in the Last Year: No       Review of patient's allergies indicates:  No Known Allergies    Review of Systems   Constitutional: Negative for chills and fever.   HENT:  Negative for congestion and hearing loss.    Eyes:  Negative for blurred vision and double vision.   Cardiovascular:         See HPI   Respiratory:  Negative for cough, hemoptysis and shortness of breath.    Endocrine: Negative for cold intolerance and heat intolerance.   Musculoskeletal:  Negative for joint pain and joint swelling.   Gastrointestinal:  Negative for abdominal pain, nausea and vomiting.   Genitourinary:  Negative for dysuria and hematuria.   Neurological:  Negative for focal weakness and headaches.   Psychiatric/Behavioral:  Negative for altered mental status.    All other systems reviewed and are negative.           OBJECTIVE:    There were no vitals filed for this visit.    BP Readings from Last 5 Encounters:   03/06/25 119/75   03/06/25 116/66   03/06/25 116/66   02/24/25 108/70   07/15/24 120/82        Physical Exam  Vitals and nursing note reviewed.   Constitutional:       General: She is not in acute distress.     Appearance: She is well-developed. She is not diaphoretic.   HENT:      Head: Normocephalic and atraumatic.   Eyes:      General: No scleral icterus.        Right eye: No discharge.         Left eye: No discharge.   Cardiovascular:      Rate and Rhythm: Normal rate and regular rhythm. No extrasystoles are present.     Pulses: Normal pulses and intact distal pulses.           Radial pulses are 2+ on the right side and 2+ on the left side.      Heart sounds: Normal heart sounds, S1 normal and S2 normal. Heart sounds not distant. No opening snap. No murmur heard.     No friction rub. No gallop. No S3 or S4 sounds.   Pulmonary:      Effort: Pulmonary effort is normal. No respiratory distress.      Breath sounds: No wheezing or rales.   Abdominal:      General: Bowel sounds are normal. There is no distension.      Palpations: Abdomen is soft.      Tenderness: There is no abdominal tenderness.   Musculoskeletal:         General: No deformity. Normal range of motion.      Cervical back: Normal range of motion and neck supple.   Skin:     General: Skin is warm and dry.      Findings: No erythema or rash.   Neurological:      Mental Status: She is alert.             Current Outpatient Medications   Medication Instructions    cholecalciferol, vitD3,/vit K2 (VITAMIN D3-VITAMIN K2 ORAL) 1 tablet, Daily    estradioL (ESTRACE) 0.01 % (0.1 mg/gram) vaginal cream 0.5 grams with applicator or dime-sized amount with finger in vagina nightly x 2 weeks, then twice a week thereafter       Lipid Panel:   Lab Results   Component Value Date    CHOL 218 (H) 02/04/2025    HDL 86 (H) 02/04/2025    LDLCALC 121.4 02/04/2025    TRIG 53 02/04/2025     CHOLHDL 39.4 02/04/2025         The 10-year ASCVD risk score (Helen BURGESS, et al., 2019) is: 3.5%    Values used to calculate the score:      Age: 64 years      Sex: Female      Is Non- : No      Diabetic: No      Tobacco smoker: No      Systolic Blood Pressure: 116 mmHg      Is BP treated: No      HDL Cholesterol: 86 mg/dL      Total Cholesterol: 218 mg/dL    Most Recent EKG Results  Results for orders placed or performed in visit on 02/27/25   EKG 12-lead    Collection Time: 02/27/25 11:34 AM   Result Value Ref Range    QRS Duration 82 ms    [facility] QTC Calculation 418 ms    Narrative    Test Reason :    Vent. Rate :  73 BPM     Atrial Rate :  73 BPM     P-R Int : 142 ms          QRS Dur :  82 ms      QT Int : 380 ms       P-R-T Axes :  58  40  50 degrees    QTcB Int : 418 ms    Normal sinus rhythm  ST and T wave abnormality, consider anterior ischemia  Abnormal ECG  When compared with ECG of 24-Feb-2025 15:09,  No significant change was found  Confirmed by Janeen Hurley ([id number]) on 3/3/2025 10:17:36 AM    Referred By: ANIRUDH BAINS           Confirmed By: Janeen Hurley       Most Recent Echocardiogram Results  No results found for this or any previous visit.      Most Recent Nuclear Stress Test Results  No results found for this or any previous visit.      Most Recent Cardiac PET Stress Test Results  No results found for this or any previous visit.      Most Recent Cardiovascular Angiogram results  No results found for this or any previous visit.      Other Most Recent Cardiology Results  No results found for this or any previous visit.        All pertinent data including labs, imaging, EKGs, and studies listed above were reviewed.  All EKG tracing in [facility] were personally interpreted by this provider.    ASSESSMENT:   Nenita Armstrong is a 64 y.o. female who presents for evaluation of nonspecific ST-T changes on recent ECG for upcoming uterine surgery. No angina or CHF symptoms. Stress echo  with no exercise induced ischemic changes. EF normal.     ASCVD risk 1.72% (low risk)    1. Abnormal ECG        2. Uterine prolapse        3. Urinary incontinence, urge          PLAN FOR TREATMENT OF ABOVE DIAGNOSES:     RCRI score 0 - class I  with equates to a 3.9% 30-day risk of death, MI, or cardiac arrest. ECG shows nonspecific ST-T changes. Stress echo with no evidence of exercise induced ischemia. Patient is able to achieve >4 METs. No unstable cardiac conditions (ACS, acute decompensated heart failure, arrhythmias) that necessitates stabilization prior to surgery. Okay to proceed with surgery without any further cardiac evaluation.      Mediterranean diet  Exercise as able following surgery    F/u in 1 year (sooner if needed)    Franklin Chavez MD  Cardiac Electrophysiology    This note was partially generated using voice recognition and generative artificial intelligence software. While every effort has been made to ensure its accuracy, transcription errors may exist. Please reach out to me with any questions or if clarification is needed.

## 2025-03-11 NOTE — TELEPHONE ENCOUNTER
----- Message from Compa Chavez MD sent at 3/10/2025  5:09 PM CDT -----  Regarding: Surgical clearance  Good afternoon,I saw Ms. Armstrong today. She is fine to proceed with surgery this Thursday without any further cardiovascular evaluation. Stress testing was normal. Let me know if you have any questions or concerns. Take care!Franklin Chavez

## 2025-03-12 ENCOUNTER — TELEPHONE (OUTPATIENT)
Dept: UROGYNECOLOGY | Facility: CLINIC | Age: 65
End: 2025-03-12
Payer: COMMERCIAL

## 2025-03-13 ENCOUNTER — HOSPITAL ENCOUNTER (OUTPATIENT)
Facility: OTHER | Age: 65
Discharge: HOME | End: 2025-03-14
Attending: OBSTETRICS & GYNECOLOGY | Admitting: OBSTETRICS & GYNECOLOGY
Payer: COMMERCIAL

## 2025-03-13 ENCOUNTER — ANESTHESIA (OUTPATIENT)
Dept: SURGERY | Facility: OTHER | Age: 65
End: 2025-03-13
Payer: COMMERCIAL

## 2025-03-13 DIAGNOSIS — Z98.890 S/P ROBOT-ASSISTED SURGICAL PROCEDURE: ICD-10-CM

## 2025-03-13 DIAGNOSIS — Z98.890 S/P ROBOT-ASSISTED SURGICAL PROCEDURE: Primary | ICD-10-CM

## 2025-03-13 DIAGNOSIS — N81.4 UTEROVAGINAL PROLAPSE: ICD-10-CM

## 2025-03-13 DIAGNOSIS — N81.4 UTERINE PROLAPSE: ICD-10-CM

## 2025-03-13 LAB
GLUCOSE SERPL-MCNC: 105 MG/DL (ref 70–110)
POCT GLUCOSE: 105 MG/DL (ref 70–110)

## 2025-03-13 PROCEDURE — 25000003 PHARM REV CODE 250: Performed by: NURSE ANESTHETIST, CERTIFIED REGISTERED

## 2025-03-13 PROCEDURE — 71000016 HC POSTOP RECOV ADDL HR: Performed by: OBSTETRICS & GYNECOLOGY

## 2025-03-13 PROCEDURE — 57288 REPAIR BLADDER DEFECT: CPT | Mod: ,,, | Performed by: OBSTETRICS & GYNECOLOGY

## 2025-03-13 PROCEDURE — P9045 ALBUMIN (HUMAN), 5%, 250 ML: HCPCS | Mod: JZ,TB | Performed by: NURSE ANESTHETIST, CERTIFIED REGISTERED

## 2025-03-13 PROCEDURE — C1765 ADHESION BARRIER: HCPCS | Performed by: OBSTETRICS & GYNECOLOGY

## 2025-03-13 PROCEDURE — 63600175 PHARM REV CODE 636 W HCPCS: Performed by: OBSTETRICS & GYNECOLOGY

## 2025-03-13 PROCEDURE — 71000015 HC POSTOP RECOV 1ST HR: Performed by: OBSTETRICS & GYNECOLOGY

## 2025-03-13 PROCEDURE — 63600175 PHARM REV CODE 636 W HCPCS: Performed by: STUDENT IN AN ORGANIZED HEALTH CARE EDUCATION/TRAINING PROGRAM

## 2025-03-13 PROCEDURE — 27201423 OPTIME MED/SURG SUP & DEVICES STERILE SUPPLY: Performed by: OBSTETRICS & GYNECOLOGY

## 2025-03-13 PROCEDURE — 71000033 HC RECOVERY, INTIAL HOUR: Performed by: OBSTETRICS & GYNECOLOGY

## 2025-03-13 PROCEDURE — 88307 TISSUE EXAM BY PATHOLOGIST: CPT | Performed by: PATHOLOGY

## 2025-03-13 PROCEDURE — 25000003 PHARM REV CODE 250

## 2025-03-13 PROCEDURE — 25000003 PHARM REV CODE 250: Performed by: OBSTETRICS & GYNECOLOGY

## 2025-03-13 PROCEDURE — 58571 TLH W/T/O 250 G OR LESS: CPT | Mod: ,,, | Performed by: OBSTETRICS & GYNECOLOGY

## 2025-03-13 PROCEDURE — 63600175 PHARM REV CODE 636 W HCPCS: Performed by: ANESTHESIOLOGY

## 2025-03-13 PROCEDURE — 57425 LAPAROSCOPY SURG COLPOPEXY: CPT | Mod: ,,, | Performed by: OBSTETRICS & GYNECOLOGY

## 2025-03-13 PROCEDURE — C1771 REP DEV, URINARY, W/SLING: HCPCS | Performed by: OBSTETRICS & GYNECOLOGY

## 2025-03-13 PROCEDURE — C2628 CATHETER, OCCLUSION: HCPCS | Performed by: OBSTETRICS & GYNECOLOGY

## 2025-03-13 PROCEDURE — 88307 TISSUE EXAM BY PATHOLOGIST: CPT | Mod: 26,,, | Performed by: PATHOLOGY

## 2025-03-13 PROCEDURE — 36000713 HC OR TIME LEV V EA ADD 15 MIN: Performed by: OBSTETRICS & GYNECOLOGY

## 2025-03-13 PROCEDURE — 82962 GLUCOSE BLOOD TEST: CPT | Performed by: OBSTETRICS & GYNECOLOGY

## 2025-03-13 PROCEDURE — C1781 MESH (IMPLANTABLE): HCPCS | Performed by: OBSTETRICS & GYNECOLOGY

## 2025-03-13 PROCEDURE — 25000003 PHARM REV CODE 250: Performed by: ANESTHESIOLOGY

## 2025-03-13 PROCEDURE — 37000009 HC ANESTHESIA EA ADD 15 MINS: Performed by: OBSTETRICS & GYNECOLOGY

## 2025-03-13 PROCEDURE — 63600175 PHARM REV CODE 636 W HCPCS

## 2025-03-13 PROCEDURE — 36000712 HC OR TIME LEV V 1ST 15 MIN: Performed by: OBSTETRICS & GYNECOLOGY

## 2025-03-13 PROCEDURE — 71000039 HC RECOVERY, EACH ADD'L HOUR: Performed by: OBSTETRICS & GYNECOLOGY

## 2025-03-13 PROCEDURE — 37000008 HC ANESTHESIA 1ST 15 MINUTES: Performed by: OBSTETRICS & GYNECOLOGY

## 2025-03-13 PROCEDURE — 63600175 PHARM REV CODE 636 W HCPCS: Performed by: NURSE ANESTHETIST, CERTIFIED REGISTERED

## 2025-03-13 DEVICE — ABSORBABLE ADHESION BARRIER
Type: IMPLANTABLE DEVICE | Site: PELVIS | Status: FUNCTIONAL
Brand: GYNECARE INTERCEED

## 2025-03-13 DEVICE — TRANSVAGINAL MID-URETHRAL SLING
Type: IMPLANTABLE DEVICE | Site: PELVIS | Status: FUNCTIONAL
Brand: ADVANTAGE FIT™  SYSTEM

## 2025-03-13 DEVICE — NONABSORBABLE PROLENE SOFT MESH
Type: IMPLANTABLE DEVICE | Site: PELVIS | Status: FUNCTIONAL
Brand: GYNECARE GYNEMESH

## 2025-03-13 RX ORDER — HEPARIN SODIUM 5000 [USP'U]/ML
5000 INJECTION, SOLUTION INTRAVENOUS; SUBCUTANEOUS EVERY 8 HOURS
Status: DISCONTINUED | OUTPATIENT
Start: 2025-03-13 | End: 2025-03-14 | Stop reason: HOSPADM

## 2025-03-13 RX ORDER — CEFAZOLIN 2 G/1
2 INJECTION, POWDER, FOR SOLUTION INTRAMUSCULAR; INTRAVENOUS
Status: DISCONTINUED | OUTPATIENT
Start: 2025-03-13 | End: 2025-03-13

## 2025-03-13 RX ORDER — ZOLPIDEM TARTRATE 5 MG/1
5 TABLET ORAL NIGHTLY PRN
Status: DISCONTINUED | OUTPATIENT
Start: 2025-03-13 | End: 2025-03-14 | Stop reason: HOSPADM

## 2025-03-13 RX ORDER — PROPOFOL 10 MG/ML
VIAL (ML) INTRAVENOUS
Status: DISCONTINUED | OUTPATIENT
Start: 2025-03-13 | End: 2025-03-13

## 2025-03-13 RX ORDER — DOCUSATE SODIUM 100 MG/1
200 CAPSULE, LIQUID FILLED ORAL 2 TIMES DAILY PRN
Qty: 60 CAPSULE | Refills: 1 | Status: SHIPPED | OUTPATIENT
Start: 2025-03-13

## 2025-03-13 RX ORDER — HYDROMORPHONE HYDROCHLORIDE 2 MG/ML
0.4 INJECTION, SOLUTION INTRAMUSCULAR; INTRAVENOUS; SUBCUTANEOUS
Refills: 0 | Status: DISCONTINUED | OUTPATIENT
Start: 2025-03-13 | End: 2025-03-14 | Stop reason: HOSPADM

## 2025-03-13 RX ORDER — PROCHLORPERAZINE EDISYLATE 5 MG/ML
5 INJECTION INTRAMUSCULAR; INTRAVENOUS EVERY 6 HOURS PRN
Status: DISCONTINUED | OUTPATIENT
Start: 2025-03-13 | End: 2025-03-14 | Stop reason: HOSPADM

## 2025-03-13 RX ORDER — ALBUMIN HUMAN 50 G/1000ML
SOLUTION INTRAVENOUS
Status: DISCONTINUED | OUTPATIENT
Start: 2025-03-13 | End: 2025-03-13

## 2025-03-13 RX ORDER — MUPIROCIN 20 MG/G
OINTMENT TOPICAL 2 TIMES DAILY
Status: DISCONTINUED | OUTPATIENT
Start: 2025-03-13 | End: 2025-03-14 | Stop reason: HOSPADM

## 2025-03-13 RX ORDER — SODIUM CHLORIDE 0.9 % (FLUSH) 0.9 %
3 SYRINGE (ML) INJECTION
Status: DISCONTINUED | OUTPATIENT
Start: 2025-03-13 | End: 2025-03-14 | Stop reason: HOSPADM

## 2025-03-13 RX ORDER — OXYCODONE HYDROCHLORIDE 5 MG/1
5 TABLET ORAL EVERY 4 HOURS PRN
Qty: 35 TABLET | Refills: 0 | Status: SHIPPED | OUTPATIENT
Start: 2025-03-13

## 2025-03-13 RX ORDER — LIDOCAINE HYDROCHLORIDE AND EPINEPHRINE 10; 10 UG/ML; MG/ML
INJECTION, SOLUTION INFILTRATION; PERINEURAL
Status: DISCONTINUED | OUTPATIENT
Start: 2025-03-13 | End: 2025-03-13 | Stop reason: HOSPADM

## 2025-03-13 RX ORDER — OXYCODONE HYDROCHLORIDE 5 MG/1
5 TABLET ORAL EVERY 4 HOURS PRN
Refills: 0 | Status: DISCONTINUED | OUTPATIENT
Start: 2025-03-13 | End: 2025-03-14 | Stop reason: HOSPADM

## 2025-03-13 RX ORDER — BUPIVACAINE HYDROCHLORIDE 2.5 MG/ML
INJECTION, SOLUTION EPIDURAL; INFILTRATION; INTRACAUDAL; PERINEURAL
Status: DISCONTINUED | OUTPATIENT
Start: 2025-03-13 | End: 2025-03-13 | Stop reason: HOSPADM

## 2025-03-13 RX ORDER — VECURONIUM BROMIDE 1 MG/ML
INJECTION, POWDER, LYOPHILIZED, FOR SOLUTION INTRAVENOUS
Status: DISCONTINUED | OUTPATIENT
Start: 2025-03-13 | End: 2025-03-13

## 2025-03-13 RX ORDER — HEPARIN SODIUM 5000 [USP'U]/ML
5000 INJECTION, SOLUTION INTRAVENOUS; SUBCUTANEOUS
Status: DISCONTINUED | OUTPATIENT
Start: 2025-03-13 | End: 2025-03-13

## 2025-03-13 RX ORDER — OXYCODONE HYDROCHLORIDE 5 MG/1
5 TABLET ORAL
Status: DISCONTINUED | OUTPATIENT
Start: 2025-03-13 | End: 2025-03-14 | Stop reason: HOSPADM

## 2025-03-13 RX ORDER — SODIUM CHLORIDE, SODIUM LACTATE, POTASSIUM CHLORIDE, CALCIUM CHLORIDE 600; 310; 30; 20 MG/100ML; MG/100ML; MG/100ML; MG/100ML
INJECTION, SOLUTION INTRAVENOUS CONTINUOUS
Status: DISCONTINUED | OUTPATIENT
Start: 2025-03-13 | End: 2025-03-13

## 2025-03-13 RX ORDER — SODIUM CHLORIDE, SODIUM LACTATE, POTASSIUM CHLORIDE, CALCIUM CHLORIDE 600; 310; 30; 20 MG/100ML; MG/100ML; MG/100ML; MG/100ML
INJECTION, SOLUTION INTRAVENOUS CONTINUOUS
Status: DISCONTINUED | OUTPATIENT
Start: 2025-03-13 | End: 2025-03-14 | Stop reason: HOSPADM

## 2025-03-13 RX ORDER — IBUPROFEN 600 MG/1
600 TABLET ORAL
Status: DISCONTINUED | OUTPATIENT
Start: 2025-03-13 | End: 2025-03-14 | Stop reason: HOSPADM

## 2025-03-13 RX ORDER — ACETAMINOPHEN 10 MG/ML
INJECTION, SOLUTION INTRAVENOUS
Status: DISCONTINUED | OUTPATIENT
Start: 2025-03-13 | End: 2025-03-13

## 2025-03-13 RX ORDER — ACETAMINOPHEN 325 MG/1
500 TABLET ORAL EVERY 6 HOURS PRN
Qty: 60 TABLET | Refills: 1 | Status: SHIPPED | OUTPATIENT
Start: 2025-03-13

## 2025-03-13 RX ORDER — HYDROMORPHONE HYDROCHLORIDE 2 MG/ML
0.4 INJECTION, SOLUTION INTRAMUSCULAR; INTRAVENOUS; SUBCUTANEOUS EVERY 5 MIN PRN
Status: DISCONTINUED | OUTPATIENT
Start: 2025-03-13 | End: 2025-03-14 | Stop reason: HOSPADM

## 2025-03-13 RX ORDER — PROPOFOL 10 MG/ML
VIAL (ML) INTRAVENOUS CONTINUOUS PRN
Status: DISCONTINUED | OUTPATIENT
Start: 2025-03-13 | End: 2025-03-13

## 2025-03-13 RX ORDER — LIDOCAINE HYDROCHLORIDE 20 MG/ML
INJECTION INTRAVENOUS
Status: DISCONTINUED | OUTPATIENT
Start: 2025-03-13 | End: 2025-03-13

## 2025-03-13 RX ORDER — PROCHLORPERAZINE EDISYLATE 5 MG/ML
5 INJECTION INTRAMUSCULAR; INTRAVENOUS EVERY 30 MIN PRN
Status: DISCONTINUED | OUTPATIENT
Start: 2025-03-13 | End: 2025-03-14 | Stop reason: HOSPADM

## 2025-03-13 RX ORDER — ROCURONIUM BROMIDE 10 MG/ML
INJECTION, SOLUTION INTRAVENOUS
Status: DISCONTINUED | OUTPATIENT
Start: 2025-03-13 | End: 2025-03-13

## 2025-03-13 RX ORDER — ONDANSETRON HYDROCHLORIDE 2 MG/ML
INJECTION, SOLUTION INTRAVENOUS
Status: DISCONTINUED | OUTPATIENT
Start: 2025-03-13 | End: 2025-03-13

## 2025-03-13 RX ORDER — FAMOTIDINE 20 MG/1
20 TABLET, FILM COATED ORAL
Status: COMPLETED | OUTPATIENT
Start: 2025-03-13 | End: 2025-03-13

## 2025-03-13 RX ORDER — ACETAMINOPHEN 325 MG/1
650 TABLET ORAL EVERY 6 HOURS
Status: DISCONTINUED | OUTPATIENT
Start: 2025-03-13 | End: 2025-03-14 | Stop reason: HOSPADM

## 2025-03-13 RX ORDER — OXYCODONE HYDROCHLORIDE 5 MG/1
10 TABLET ORAL EVERY 4 HOURS PRN
Refills: 0 | Status: DISCONTINUED | OUTPATIENT
Start: 2025-03-13 | End: 2025-03-14 | Stop reason: HOSPADM

## 2025-03-13 RX ORDER — PHENYLEPHRINE HYDROCHLORIDE 10 MG/ML
INJECTION INTRAVENOUS
Status: DISCONTINUED | OUTPATIENT
Start: 2025-03-13 | End: 2025-03-13

## 2025-03-13 RX ORDER — FENTANYL CITRATE 50 UG/ML
INJECTION, SOLUTION INTRAMUSCULAR; INTRAVENOUS
Status: DISCONTINUED | OUTPATIENT
Start: 2025-03-13 | End: 2025-03-13

## 2025-03-13 RX ORDER — DEXAMETHASONE SODIUM PHOSPHATE 4 MG/ML
INJECTION, SOLUTION INTRA-ARTICULAR; INTRALESIONAL; INTRAMUSCULAR; INTRAVENOUS; SOFT TISSUE
Status: DISCONTINUED | OUTPATIENT
Start: 2025-03-13 | End: 2025-03-13

## 2025-03-13 RX ORDER — GLUCAGON 1 MG
1 KIT INJECTION
Status: DISCONTINUED | OUTPATIENT
Start: 2025-03-13 | End: 2025-03-14 | Stop reason: HOSPADM

## 2025-03-13 RX ORDER — ONDANSETRON HYDROCHLORIDE 2 MG/ML
4 INJECTION, SOLUTION INTRAVENOUS EVERY 6 HOURS PRN
Status: DISCONTINUED | OUTPATIENT
Start: 2025-03-13 | End: 2025-03-14 | Stop reason: HOSPADM

## 2025-03-13 RX ORDER — DOCUSATE SODIUM 100 MG/1
100 CAPSULE, LIQUID FILLED ORAL 2 TIMES DAILY
Status: DISCONTINUED | OUTPATIENT
Start: 2025-03-13 | End: 2025-03-14 | Stop reason: HOSPADM

## 2025-03-13 RX ORDER — IBUPROFEN 600 MG/1
600 TABLET ORAL EVERY 6 HOURS PRN
Qty: 60 TABLET | Refills: 1 | Status: SHIPPED | OUTPATIENT
Start: 2025-03-13

## 2025-03-13 RX ORDER — MUPIROCIN 20 MG/G
OINTMENT TOPICAL
Status: DISCONTINUED | OUTPATIENT
Start: 2025-03-13 | End: 2025-03-13

## 2025-03-13 RX ORDER — NALOXONE HCL 0.4 MG/ML
0.02 VIAL (ML) INJECTION
Status: DISCONTINUED | OUTPATIENT
Start: 2025-03-13 | End: 2025-03-14 | Stop reason: HOSPADM

## 2025-03-13 RX ADMIN — SODIUM CHLORIDE, SODIUM LACTATE, POTASSIUM CHLORIDE, AND CALCIUM CHLORIDE: 600; 310; 30; 20 INJECTION, SOLUTION INTRAVENOUS at 08:03

## 2025-03-13 RX ADMIN — PHENYLEPHRINE HYDROCHLORIDE 100 MCG: 10 INJECTION INTRAVENOUS at 04:03

## 2025-03-13 RX ADMIN — FAMOTIDINE 20 MG: 20 TABLET, FILM COATED ORAL at 09:03

## 2025-03-13 RX ADMIN — VECURONIUM BROMIDE FOR INJECTION 2 MG: 1 INJECTION, POWDER, LYOPHILIZED, FOR SOLUTION INTRAVENOUS at 02:03

## 2025-03-13 RX ADMIN — PROPOFOL 50 MCG/KG/MIN: 10 INJECTION, EMULSION INTRAVENOUS at 01:03

## 2025-03-13 RX ADMIN — OXYCODONE HYDROCHLORIDE 5 MG: 5 TABLET ORAL at 05:03

## 2025-03-13 RX ADMIN — PHENYLEPHRINE HYDROCHLORIDE 100 MCG: 10 INJECTION INTRAVENOUS at 01:03

## 2025-03-13 RX ADMIN — OXYCODONE HYDROCHLORIDE 5 MG: 5 TABLET ORAL at 09:03

## 2025-03-13 RX ADMIN — MUPIROCIN: 20 OINTMENT TOPICAL at 09:03

## 2025-03-13 RX ADMIN — ALBUMIN (HUMAN) 250 ML: 12.5 SOLUTION INTRAVENOUS at 02:03

## 2025-03-13 RX ADMIN — ZOLPIDEM TARTRATE 5 MG: 5 TABLET ORAL at 09:03

## 2025-03-13 RX ADMIN — ROCURONIUM BROMIDE 50 MG: 10 INJECTION, SOLUTION INTRAVENOUS at 01:03

## 2025-03-13 RX ADMIN — SODIUM CHLORIDE, SODIUM LACTATE, POTASSIUM CHLORIDE, AND CALCIUM CHLORIDE: 600; 310; 30; 20 INJECTION, SOLUTION INTRAVENOUS at 12:03

## 2025-03-13 RX ADMIN — FENTANYL CITRATE 50 MCG: 50 INJECTION, SOLUTION INTRAMUSCULAR; INTRAVENOUS at 01:03

## 2025-03-13 RX ADMIN — ALBUMIN (HUMAN) 250 ML: 12.5 SOLUTION INTRAVENOUS at 01:03

## 2025-03-13 RX ADMIN — CEFAZOLIN 2 G: 2 INJECTION, POWDER, FOR SOLUTION INTRAMUSCULAR; INTRAVENOUS at 01:03

## 2025-03-13 RX ADMIN — ACETAMINOPHEN 1000 MG: 10 INJECTION INTRAVENOUS at 04:03

## 2025-03-13 RX ADMIN — SODIUM CHLORIDE, SODIUM LACTATE, POTASSIUM CHLORIDE, AND CALCIUM CHLORIDE: 600; 310; 30; 20 INJECTION, SOLUTION INTRAVENOUS at 03:03

## 2025-03-13 RX ADMIN — DOCUSATE SODIUM 100 MG: 100 CAPSULE, LIQUID FILLED ORAL at 08:03

## 2025-03-13 RX ADMIN — GLYCOPYRROLATE 0.1 MG: 0.2 INJECTION, SOLUTION INTRAMUSCULAR; INTRAVITREAL at 02:03

## 2025-03-13 RX ADMIN — PROPOFOL 150 MG: 10 INJECTION, EMULSION INTRAVENOUS at 01:03

## 2025-03-13 RX ADMIN — LIDOCAINE HYDROCHLORIDE 50 MG: 20 INJECTION, SOLUTION INTRAVENOUS at 01:03

## 2025-03-13 RX ADMIN — IBUPROFEN 600 MG: 600 TABLET, FILM COATED ORAL at 08:03

## 2025-03-13 RX ADMIN — GLYCOPYRROLATE 0.2 MG: 0.2 INJECTION, SOLUTION INTRAMUSCULAR; INTRAVITREAL at 01:03

## 2025-03-13 RX ADMIN — DEXAMETHASONE SODIUM PHOSPHATE 8 MG: 4 INJECTION, SOLUTION INTRAMUSCULAR; INTRAVENOUS at 01:03

## 2025-03-13 RX ADMIN — HEPARIN SODIUM 5000 UNITS: 5000 INJECTION INTRAVENOUS; SUBCUTANEOUS at 12:03

## 2025-03-13 RX ADMIN — HEPARIN SODIUM 5000 UNITS: 5000 INJECTION INTRAVENOUS; SUBCUTANEOUS at 08:03

## 2025-03-13 RX ADMIN — MUPIROCIN: 20 OINTMENT TOPICAL at 08:03

## 2025-03-13 RX ADMIN — VECURONIUM BROMIDE FOR INJECTION 2 MG: 1 INJECTION, POWDER, LYOPHILIZED, FOR SOLUTION INTRAVENOUS at 03:03

## 2025-03-13 RX ADMIN — ONDANSETRON HYDROCHLORIDE 4 MG: 2 INJECTION INTRAMUSCULAR; INTRAVENOUS at 01:03

## 2025-03-13 RX ADMIN — VECURONIUM BROMIDE FOR INJECTION 2 MG: 1 INJECTION, POWDER, LYOPHILIZED, FOR SOLUTION INTRAVENOUS at 01:03

## 2025-03-13 NOTE — INTERVAL H&P NOTE
The patient has been examined and the H&P has been reviewed:    I concur with the findings and no changes have occurred since H&P was written.    Surgery risks, benefits and alternative options discussed and understood by patient/family.    Nenita Armstrong is 64 y.o. presenting for scheduled RA-TLH/BS/SCP/all other indicated procedures.    Temp:  [97.8 °F (36.6 °C)] 97.8 °F (36.6 °C)  Pulse:  [75] 75  Resp:  [18] 18  SpO2:  [100 %] 100 %  BP: (133)/(72-75) 133/75    General: NAD, alert, oriented, cooperative  HEENT: NCAT, EOM grossly intact  Lungs: Normal WOB  Heart: regular rate  Abdomen: soft, nondistended, nontender, no rebound or guarding    Consents in chart. Pre-operative heparin given at 1202 . All questions answered and concerns addressed. To OR for planned procedure.      Sharona Montalvo MD  OB/GYN PGY-3     Active Hospital Problems    Diagnosis  POA    Uterine prolapse [N81.4]  Yes     plan repair 2024        Resolved Hospital Problems   No resolved problems to display.

## 2025-03-13 NOTE — ANESTHESIA POSTPROCEDURE EVALUATION
Anesthesia Post Evaluation    Patient: Nenita Armstrong    Procedure(s) Performed: Procedure(s) (LRB):  ROBOTIC HYSTERECTOMY,WITH SALPINGO-OOPHORECTOMY (Bilateral)  XI ROBOTIC SACROCOLPOPEXY, ABDOMINAL (N/A)  SLING, MIDURETHRAL (N/A)  CYSTOSCOPY (N/A)    Final Anesthesia Type: general      Patient location during evaluation: PACU  Patient participation: Yes- Able to Participate  Level of consciousness: awake and alert  Post-procedure vital signs: reviewed and stable  Pain management: adequate  Airway patency: patent  JOSE mitigation strategies: Extubation while patient is awake  PONV status at discharge: No PONV  Anesthetic complications: no      Cardiovascular status: hemodynamically stable  Respiratory status: unassisted  Hydration status: euvolemic  Follow-up not needed.              Vitals Value Taken Time   /56 03/13/25 18:02   Temp 36.4 °C (97.6 °F) 03/13/25 17:45   Pulse 79 03/13/25 18:07   Resp 16 03/13/25 18:00   SpO2 96 % 03/13/25 18:07   Vitals shown include unfiled device data.      No case tracking events are documented in the log.      Pain/Fara Score: Pain Rating Prior to Med Admin: 4 (3/13/2025  5:59 PM)  Fara Score: 9 (3/13/2025  6:00 PM)

## 2025-03-13 NOTE — TRANSFER OF CARE
"Anesthesia Transfer of Care Note    Patient: Nenita Armstrong    Procedure(s) Performed: Procedure(s) (LRB):  ROBOTIC HYSTERECTOMY,WITH SALPINGO-OOPHORECTOMY (Bilateral)  XI ROBOTIC SACROCOLPOPEXY, ABDOMINAL (N/A)  SLING, MIDURETHRAL (N/A)  CYSTOSCOPY (N/A)    Patient location: PACU    Anesthesia Type: general    Transport from OR: Transported from OR on 6-10 L/min O2 by face mask with adequate spontaneous ventilation    Post pain: adequate analgesia    Post assessment: no apparent anesthetic complications and tolerated procedure well    Post vital signs: stable    Level of consciousness: awake and alert    Nausea/Vomiting: no nausea/vomiting    Complications: none    Transfer of care protocol was followed      Last vitals: Visit Vitals  /75   Pulse 75   Temp 36.6 °C (97.8 °F) (Oral)   Resp 18   Ht 5' 3" (1.6 m)   Wt 63.8 kg (140 lb 10.5 oz)   SpO2 100%   Breastfeeding No   BMI 24.92 kg/m²     "

## 2025-03-13 NOTE — OP NOTE
Date of Operation: 03/13/2025    Title of Operation:  1)  Robotic-assisted total laparoscopic hysterectomy with bilateral salpingo-oophorectomy  2)  Robotic-assisted laparoscopic sacral colpopexy with polypropylene mesh  3)  Placement of retropubic tension-free midurethral sling, Advantage Fit (Sigmascreening)  4)  Cystourethroscopy    Indications for Surgery:  1)  UI:  (--) GAB. (+) UUI (occasionally, ann 1st AM).  (--) pads, +rare underwear changes.  Daytime frequency: Q 3 hours.  Nocturia: Yes: 1/night.   (--) dysuria,  (--) hematuria,  (--) frequent UTIs.  (--) complete bladder emptying. PV to help.      2)  POP:  Present. below introitus.  Symptoms:(+)  pressure, feels weird, has to PV.  (--) vaginal bleeding. (--) vaginal discharge. (--) sexually active.  (--) h/o dyspareunia.   has had some ED.  (--)  Vaginal dryness.  (--) vaginal estrogen use. Is supposed to use vaginal estrogen.    POP-Q:  Aa +3; Ba +5; C +6; Ap +3; Bp +5; D -3.  Genital hiatus 4, perineal body 3, total vaginal length 10-11.   Pvr 50 mL     3)  BM:  (--) constipation/straining.  (--) chronic diarrhea. (--) hematochezia.  (--) fecal incontinence.  (--) fecal smearing/urgency.  (+) complete evacuation.      02/18/2024  Pelvic ultrasound  The uterus measures 7.8 x 2.9 x 4.2 cm.  The endometrial stripe measures 2 mm on the midline sagittal image.  Neither ovary is seen despite transabdominal transvaginal technique.  His there is a hypoechoic mass centered in the lower endometrial region measuring 10 x 7 x 7 mm.  Whether this relates to submucosal fibroid or his endometrial mass is uncertain.  Is the   Impression:   Hypoechoic apparently solid endometrial mass which could represent a submucosal fibroid or an endometrial mass.  More proximally the endometrial stripe is not enlarged measuring 2-3 mm.     Changes from last visit:  Voiding every 2 hours during the day and denies nocturia  Rare UUI in early am  Denies constipation or  straining     03/06/2025  Suds  1.  VOIDING PHASE:       a.  Uroflowmetry:  Prolapse reduction: No  Voided volume:  138 mL   Voiding time:   59 seconds  Max flow:  13 mL/s  Avg flow:   5 mL/s   PVR:   5 mL     The overall configuration of this uroflow study was with insufficient volume for interpretation.       b.  Pressure flow:  Prolapse reduction: No  Voided volume:   465 mL  Voiding time:   84 seconds  Peak flow:  25 mL/s   Avg flow:  8 mL/s  Max det pressure:  75  cm H20  Det pressure at max flow: 47 cm H20  Void initiated by detrusor contraction followed by Valsalva.    Urethral relaxation (EMG):  present.    PVR (calculated):  0 mL     The overall configuration of this pressure flow study was prolonged with concern for voiding dysfunction (Valsalva assist).       2.  FILLING PHASE:  1st desire: 153 mL  Normal desire:  204 mL  Strong desire:  380 mL  Urgency:  413 mL  Compliance (calculated)  approx 150 mL/cm H20  EMG activity during filling:  stable  Detrusor contractions observed: No.       3.  URETHRAL FUNCTION/STORAGE PHASE:     a.  WITH prolapse reduction:  CLPP (150 mL): Positive  at  109 cm H20  VLPP (150 mL): Positive  at  52 cm H20   CLPP (300 mL): Positive  at  135 cm H20  VLPP (300 mL): Positive  at  62 cm H20      These findings are consistent with Positive urodynamic stress incontinence.     Assessment:  UF with insufficient volume for interpretation.   PF prolonged with concern for voiding dysfunction (Valsalva assist).  Compliance normal.  Max capacity 465.  DO (--).  GAB (+).       Cysto  normal    Preoperative Diagnosis:  1. Urinary incontinence, urge    2. Uterine prolapse    3. Cystocele, midline    4. Rectocele, female    5. Vaginal atrophy    6.     Concern for voiding dysfunction (Valsalva assist)  7.     Urodynamic stress incontinence    Postoperative Diagnosis:  1. Urinary incontinence, urge    2. Uterine prolapse    3. Cystocele, midline    4. Rectocele, female    5. Vaginal atrophy     6.     Concern for voiding dysfunction (Valsalva assist)  7.     Urodynamic stress incontinence    Anesthesia:  General endotracheal anesthesia.  Additionally, 0.5% marcaine was injected prior to placement of laparoscopic trocars, and a dilute solution of 1% lidocaine with epinephrine was injected vaginally for local anesthesia.    Specimen (Bacteriological, Pathological or other):  Uterus, cervix, bilateral fallopian tubes and ovaries    Vaginal Packing (type and #):   none    Prosthetic Device/Implant:  1)  Gynecare gynemesh (10 x 15 cm).  LOT# [id number].    2)  Interceed barrier.  LOT# [id number].    3)  Advantage Fit sling.  LOT#  [id number].      Surgeons Narrative:    Surgeon: Barbara Beauchamp MD    Assistants:  Sharona Montalvo MD (PGY3).  MARKUS Doty (insufficient resident assistance).      Intravenous Fluids:  1300 mL     Estimated Blood Loss:  200 mL     Urine Output:  300 mL     Counts:  Sponge, lap, needle counts correct x 2.     Drains: Zamudio catheter.     Disposition:  The patient was sent to the PACU in stable condition.     Findings:     1.  On exam under anesthesia,  normal external female genitalia. There was prolapse as previously noted in clinic.  Uterus and cervix: normal size.  Adnexa: non palpable.     2.  On laparoscopic survey:    --The bowel was grossly Normal.    --The appendix was not visualized.   --The uterus and cervix were present, and normal.  Bilateral tubes/ovaries were grossly normal.   --The liver margin Normal.   --The gall bladder was present and Normal.   --Bilateral ureters were visualized and noted to vermiculate at the start and close of the case.    --Adhesions were absent.    --Other findings included:  none     3.  On cystoscopy, the bladder mucosa was Normal.  After RATLH/BSO/ASC/sling, there was no suture or mesh within the bladder mucosa.  The ureteral orifices were visualized bilaterally with (+) noted good efflux x 2. On a systematic survey of the bladder dome to the  base of the urethrovesical junction, there were not other abnormalities noted. The urethra was normal on retraction of the scope.     4.  Rectal exam:  At the close of the case, rectal exam was performed.  There was no suture, mesh, or other injury noted on exam.  No obvious masses were palpated.     Description of procedure:    The patient was identified in the preoperative area where informed consent was confirmed, and she was taken to the operating room where an adequate level of general anesthesia was obtained.  The patient was positioned in lithotomy position with legs in Vicente stirrups. Care was taken to avoid joint hyperflexion or hyperextension, and all extremity surfaces were carefully padded so as to minimize risk of neurologic injury. Intravenous antibiotics were administered preoperatively. Sequential compression devices were applied to the patient's lower extremities preoperatively and heparin was administered subcutaneously for VTE prophylaxis.  Surgical time-out was performed, where the patient was identified and procedures confirmed.  An examination under anesthesia was performed with findings described as above.  The patient's abdomen, perineum, and vagina were sterilely prepped and draped. A valdez catheter was placed in the bladder for drainage.      A weighted speculum was placed in the vagina.  The cervix was identified, the posterior lip was grasped with a single toothed tenaculum, and stay sutures of 0-vicryl were placed in the anterior and posterior cervix.  The uterus was sounded, and the appropriate uterine manipulator and KOH colpotomizer were selected.  The ERNESTO/KOH apparatus was assembled, and the uterine manipulator was advanced into the uterine cavity until the KOH colpotomizer was secured optimally around the cervix.  The uterine manipulator was inflated to secure the device, and a vaginal occluder balloon was placed distally to the ERNESTO/KOH and inflated until the vaginal cavity was  occluded.      First, we placed laparoscopic ports. Before placement of each laparoscopic port, several mL of 0.25% Marcaine were injected subcutaneously as well as deeply into the tissue of each site.  First, a supraumbilical incision of 5-mm was made, and the 5 mm trocar was inserted into the incision until peritoneal entry was gained and confirmed. Carbon dioxide was insufflated through the port until an adequate pneumoperitoneum of no greater than 15 mm Hg was obtained.  The laparoscopic camera was inserted through this port for visualization of all subsequent port placement.  Two 8 mm ports were place on bilaterally to the supraumbilical port, each approximately 8-10 cm lateral, along the mid clavicular line at the level of the umbilicus, at least 2 cm cephalad and medial of the anterior superior iliac spine. Each 8 mm trocar was inserted under direct visualization without significant trauma.   An 8 mm airseal port was placed in the right upper quadrant, superior to the umbilicus and midway between the right lower quadrant port and the umbilicus in a triangulated fashion. A third 8 mm port was placed at the left side, midway between the left lower quadrant port and the umbilicus.  The 5 mm umbilical camera port was then exchanged for a 8 mm robotic camera port.  There were no complications with these port placements. A total of 5 ports had been placed, including the supraumbilical port for the camera. The robot was then docked.      We began the case with total laparoscopic hysterectomy.  The ureters were visualized bilaterally.  Decision had previously been made to remove bilateral tubes and ovaries.  Sequentially, the right infundibulopelvic, round, and broad ligaments were electrodessicated with bipolar cautery.  The anterior and posterior sheaths of the round ligament were gently , the uterine vessels were exposed, and electrodessicated.  Using sharp dissection, the vesicouterine fold was created,  and the bladder was carefully dissected off the anterior vagina.   Next, these same steps were repeated along the patient's left side.  Excellent hemostasis was noted.  Using the monopolar marisa, the anterior colpotomy was created along the KOH device and considered circumferentially until the entire uterine specimen was freed from the pelvis.  The specimen was then handed to pathology for further evaluation.  The vaginal cuff was closed with several figure-of-eight stitches of 0-vicryl.  Excellent hemostasis was noted.     We then proceeded with sacral colpopexy. We gently retracted the sigmoid colon to the patient's left so that we could easily visualize the sacral promontory.  The peritoneum was opened over the sacral  promontory, and the presacral space was developed.  Care was used to avoid any trauma to the vasculature or nerve supply underlying this space during this process, and excellent hemostasis was noted throughout. Peritoneal dissection was continued over the sacral promontory to provide adequate space for attachment of the GyneMesh. This process was aided with blunt dissection using Kittners. The peritoneum was then opened cadually all the way to the vaginal cuff.  With the EEA sizers in the vagina and in the rectum, the rectovaginal space was demonstrated. The peritoneum overlying this spaced was opened and the space bluntly dissected down to approximately the distal 1/3 of the vagina. Hemostasis was maintained with electrocautery. After retrograde filling the bladder to demonstrate the most cephalad portion of the bladder, the bladder was dissected off the vaginal cuff and anterior vagina.  Again, excellent hemostasis was noted.    Two pieces of Gynemesh each about 4 cm in width x 15 cm in length were cut. These were broadly affixed to the vagina anteriorly and posteriorly with several rows of interrupted sutures of 2-0 PDS, amish 6 sutures on the posterior vagina and 6 sutures on the anterior  vagina. With the EEA sizer deviating the vagina to the right pararectal space, the pieces of mesh were tensioned appropriately and sutured to the anterior ligament overlying the presacral space at amish the level of S1 and S2 using 2 x 0-Ethibond sutures. Care was taken to make sure that the mesh was not placed under tension. Excellent hemostasis was noted during this process.  Care was used to avoid trauma to the presacral vasculature during this step. Excess mesh was removed. Interrupted stitches of 0-Vicryl was used to close the peritoneum over the sacral promontory and along the entire extent of the GyneMesh to the vaginal cuff.  Excellent hemostasis was noted at the end of the procedure. All needles and suture pieces were removed from the pelvis laparoscopically throughout the procedure.  Needles, sponge, and lap counts were correct x 2 at the end of the procedure. At the end of the robotic portion of the case, another pelvic survey was completed.  The pelvis was irrigated, all irrigants removed. All operative areas were noted to be hemostatic.      Next, cystourethroscopy was performed.  Findings were as noted above.  There were  no other abnormalities noted.  Ureteral orifices were noted to have good efflux bilaterally.  The bladder was drained, and a Zamudio catheter was replaced.    At this point, the robotic/laparoscopic portion of the procedure was completed. The pelvis was irrigated, and all irrigants were removed. Interceed was placed along all suture lines and planes of dissection to discourage future adhesion formation. All abdominal incisions were <1 cm, and fascial closure was unnecessary. The abdomen was deflated and all laparoscopic sleeves and other instruments were removed from the abdomen. All laparoscopic skin incisions were closed with subcuticular stitches of 4-0 Monocryl and secured with steri strips and band-aids.  Excellent cosmesis and hemostasis was noted.             At this point, we  turned our attention vaginally.  All walls were well-supported, and no further repairs were needed. Rectal exam was normal as above noted. We then proceeded with placement of the Advantage Fit midurethral sling. The skin was marked just above the symphysis pubis, 2 cm laterally on either side of the midline indicating the exit sites for the trocars.  The Zamudio catheter was palpated at the urethrovesical junction, and 2 Allis clamps were placed at the anterior vaginal wall along the midurethra. The Zamudio catheter was removed from the patient's bladder. The vaginal epithelium between the two Allis clamps was injected with the 1% lidocaine with epinephrine.  Metzenbaum scissors were used to dissect the vaginal epithelium off the underlying pubocervical muscular tissue in the direction of the angle formed between the ischiopubic ramus and the pubic bone bilaterally. The rigid catheter guide was used to deviate the bladder neck and urethra to the patient's left while the right trocar was advanced to the dissected tract in the patient's right side.  Once the urogenital membrane was perforated, the trocar and handle were reoriented in the sagittal plane and the tip of the trocar was advanced through the retropubic space in intimate proximity to the pubic bone.  The trocar was then advanced through the skin approximately 2 cm to the right of the midline just above the pubic symphysis. The passage of the Advantage Fit trocar was repeated on the patient's left hand side in a similar fashion, using the catheter guide to deviate the bladder neck to the right.  At this point, cystourethroscopy was performed. Cystoscopy was negative for injury after infusion of at least 300 mL in the bladder.  The ureteral orifices were noted to have good efflux bilaterally.  The bladder was then drained. With a #10 Hegar dilator placed between the sling mesh and the urethra, the arms of the sling were elevated above the pubic symphysis and the  plastic sheaths were removed from the mesh arms.  Excess mesh was trimmed beneath the suprapubic skin.  The Hegar dilator ensured that the mesh sling was placed in a tension-free manner.  The vaginal mucosa overlying the sling mesh was closed with a several mattress stitches of 2-0 Vicryl with excellent hemostasis noted.  The suprapubic incisions were closed with a 4-0 monocryl and sealed with dermabond.    At the close of the case, all counts were correct x2.  The vagina was irrigated, and all irrigants were removed.  The vagina was packed with a role of Kerlix, coated with premarin cream for assurance of immediate postop hemostasis.  The Zamudio was in place and draining well.  The patient was awakened from general endotracheal anesthesia and was taken to the Recovery Room in stable condition.  She tolerated the procedure well.    I was present and scrubbed for the entire procedure.

## 2025-03-13 NOTE — ANESTHESIA PROCEDURE NOTES
Intubation    Date/Time: 3/13/2025 1:10 PM    Performed by: Joanna Aly CRNA  Authorized by: Renato Farrell MD    Intubation:     Induction:  Intravenous    Intubated:  Postinduction    Mask Ventilation:  Easy mask    Attempts:  1    Attempted By:  CRNA    Method of Intubation:  Video laryngoscopy    Blade:  Low 3    Laryngeal View Grade: Grade I - full view of cords      Difficult Airway Encountered?: No      Complications:  None    Airway Device:  Oral endotracheal tube    Airway Device Size:  7.0    Style/Cuff Inflation:  Cuffed (inflated to minimal occlusive pressure)    Secured at:  The lips    Placement Verified By:  Capnometry    Complicating Factors:  None    Findings Post-Intubation:  BS equal bilateral and atraumatic/condition of teeth unchanged

## 2025-03-13 NOTE — PROGRESS NOTES
Progress Note  Gynecology    Admit Date: 3/13/2025  LOS: 0    Reason for Admission:  S/P robot-assisted surgical procedure    SUBJECTIVE:     Nenita Armstrong is a 64 y.o. who is POD#0 s/p RA-TLH/BSO/SCP/MUS for the treatment of pelvic organ prolapse.    Pt doing well this morning. Pain is well controlled with tylenol, ibuprofen, and PRN oxy 5 x3. She is tolerating fluids without N/V but has not yet eaten. Has not yet ambulated. Voiding without difficulty via valdez. Passing flatus. She is not yet having bowel movements.    OBJECTIVE:     Vital Signs   Temp:  [97.5 °F (36.4 °C)-98.3 °F (36.8 °C)] 98.3 °F (36.8 °C)  Pulse:  [59-84] 65  Resp:  [16-18] 16  SpO2:  [95 %-100 %] 96 %  BP: (104-133)/(54-75) 104/54      Intake/Output Summary (Last 24 hours) at 3/14/2025 0654  Last data filed at 3/14/2025 0512  Gross per 24 hour   Intake 3648 ml   Output 1675 ml   Net 1973 ml       Physical Exam:  Gen: A&Ox3, NAD  CV: Normal rate  Pulm: Normal respiratory effort  Abd: Soft, non-distended, non-tender to palpation without rebound or guarding  Inc: All robotic incisions covered with bandaids and steri strips without shadowing  Ext: PPP, no peripheral edema, TEDs in place  : Valdez in place draining clear yellow urine     Laboratory:  Recent Labs   Lab 03/14/25  0253   WBC 8.56   HGB 12.0   HCT 35.3*   MCV 89          Recent Labs   Lab 03/14/25  0253      K 5.0      CO2 28   BUN 13   CREATININE 0.7   *         Diagnostic Results:    ASSESSMENT/PLAN:     Active Hospital Problems    Diagnosis  POA    *S/P RA-TLH/BSO/SCP/MUS [Z98.890]  Not Applicable    Uterine prolapse [N81.4]  Yes     plan repair 2024        Resolved Hospital Problems   No resolved problems to display.       Assessment: Nentia Armstrong is a 64 y.o. who is POD#0 s/p RA-TLH/BSO/SCP/MUS for the treatment of pelvic organ prolapse.    Plan:   1. Post-op   - Routine post-op advances  - Continue PRN pain medications  - D/C valdez and  perform active voiding trial after tolerating breakfast  - Encourage ambulation   - Encourage IS  - CBC stable  - UOP adequate, 1.8cc/kg/hr  - Continue IVF until tolerating regular diet.  - Antiemetics prn nausea/vomiting.    Dispo: As patient meets appropriate post-op milestones, plan for discharge to home POD #1.     Sharona Montalvo MD  OB/GYN PGY-3

## 2025-03-13 NOTE — OR NURSING
Fara score of 9, patient easily arouses to voice, reports tolerable 3/10 pain, maintaining SPO2 on room air. Patient has met criteria and is ready for transfer.

## 2025-03-13 NOTE — DISCHARGE SUMMARY
Discharge Summary  Gynecology      Admit Date: 3/13/2025    Discharge Date and Time: 03/14/2025      Attending Physician: Barbara Beauchamp MD    Principal Diagnoses: S/P robot-assisted surgical procedure    Active Hospital Problems    Diagnosis  POA    *S/P RA-TLH/BSO/SCP/MUS [Z98.890]  Not Applicable    Uterine prolapse [N81.4]  Yes     plan repair 2024        Resolved Hospital Problems   No resolved problems to display.       Procedures: Procedure(s) (LRB):  ROBOTIC HYSTERECTOMY,WITH SALPINGO-OOPHORECTOMY (Bilateral)  XI ROBOTIC SACROCOLPOPEXY, ABDOMINAL (N/A)  SLING, MIDURETHRAL (N/A)  CYSTOSCOPY (N/A)    Discharged Condition: good    Hospital Course:   Nenita Armstrong is a 64 y.o. y.o. No obstetric history on file. female who presented on 3/13/2025 for above procedures for the treatment of pelvic organ prolapse. Patient tolerated procedure. Post-operative course was uncomplicated.    On day of discharge, patient was urinating, ambulating, and tolerating a regular diet without difficulty. Pain was well controlled on PO medication. She was discharged home on POD#1 in stable condition with instructions to follow up with Dr. Beauchamp in 6 weeks.     Consults: None    Significant Diagnostic Studies:  Recent Labs   Lab 03/14/25  0253   WBC 8.56   HGB 12.0   HCT 35.3*   MCV 89            Treatments:   1. Surgery as above    Disposition: Home or Self Care    Patient Instructions:   Current Discharge Medication List        START taking these medications    Details   acetaminophen (TYLENOL) 325 MG tablet Take 1.5 tablets (487.5 mg total) by mouth every 6 (six) hours as needed for Pain. Alternate between ibuprofen and tylenol every 3 hours. For example: @0800: ibuprofen 600mg @1100: tylenol 650mg @1400: ibuprofen 600mg @1700: tylenol 650 mg @2000: ibuprofen 600mg  Qty: 60 tablet, Refills: 1      docusate sodium (COLACE) 100 MG capsule Take 2 capsules (200 mg total) by mouth 2 (two) times daily as needed for  Constipation.  Qty: 60 capsule, Refills: 1      ibuprofen (ADVIL,MOTRIN) 600 MG tablet Take 1 tablet (600 mg total) by mouth every 6 (six) hours as needed for Pain. Alternate between ibuprofen and tylenol every 3 hours. For example: @0800: ibuprofen 600mg @1100: tylenol 650mg @1400: ibuprofen 600mg @1700: tylenol 650 mg @2000: ibuprofen 600mg  Qty: 60 tablet, Refills: 1      oxyCODONE (ROXICODONE) 5 MG immediate release tablet Take 1 tablet (5 mg total) by mouth every 4 (four) hours as needed for Pain.  Qty: 35 tablet, Refills: 0    Associated Diagnoses: S/P robot-assisted surgical procedure           CONTINUE these medications which have NOT CHANGED    Details   cholecalciferol, vitD3,/vit K2 (VITAMIN D3-VITAMIN K2 ORAL) Take 1 tablet by mouth once daily. 5000      estradioL (ESTRACE) 0.01 % (0.1 mg/gram) vaginal cream 0.5 grams with applicator or dime-sized amount with finger in vagina nightly x 2 weeks, then twice a week thereafter  Qty: 42.5 g, Refills: 11    Associated Diagnoses: Vaginal atrophy             Discharge Procedure Orders   Diet general     Lifting restrictions   Order Comments: No lifting greater than 15 pounds for six weeks.     Other restrictions (specify):   Order Comments: PELVIC REST:  No douching, tampons, or intercourse for 6 weeks.    If prescribed, vaginal estrogen cream may be used during the postoperative period.     DRIVING:  No driving while on narcotics. Driving may be resumed initially with a competent passenger one to two weeks after surgery if no longer taking narcotics.     EXERCISE:  For six weeks your exercise should be limited to walking. You may walk as far as you wish, as long as you increase your level of exertion gradually and avoid slippery surfaces. You may climb stairs as needed to get around, but should not use stair climbing for exercise.     Remove dressing in 24 hours   Order Comments: If you have a bandage on wound, you may remove it the day after dismissal.  If you  had steri-strips remove them once they begin to peel off (usually 2 weeks). Keep incision clean and dry.  Inspect the incision daily for signs and symptoms of infection.     Wound care routine (specify)   Order Comments: WOUND CARE:  If you have a band-aid or bandage on your wound, you may remove it the day after dismissal.  If you had steri-strips remove them once they begin to peel off (usually 2 weeks).  If your steri-strips still haven't come off in 2 weeks, please remove them. You may wash the wound with mild soap and water.   You may shower at any time but should avoid immersing any abdominal incisions in water for at least two weeks after surgery or until the wound is completely healed. If given, please shower with Hibiclens soap until bottle is completely finished. Keep your wound clean and dry.  You should observe your incision for signs of infection which include redness, warmth, drainage or fever.     Call MD for:  temperature >100.4     Call MD for:  persistent nausea and vomiting     Call MD for:  severe uncontrolled pain     Call MD for:  difficulty breathing, headache or visual disturbances     Call MD for:  redness, tenderness, or signs of infection (pain, swelling, redness, odor or green/yellow discharge around incision site)     Call MD for:  hives     Call MD for:   Order Comments: inability to void,urine is ketchup colored or you have large clots, vaginal bleeding is heavier than a period.    VAGINAL DISCHARGE: You may develop a vaginal discharge and intermittent vaginal spotting after surgery and up to 6 weeks postoperatively.  The discharge may have an odor and may change in color but it is normal.  This is due to dissolving stiches.  Contact your surgical team if you develop vaginal or vulvar irritation along with a discharge.  Also contact your surgical team if you have vaginal discharge that smells like urine or stool.    CONSTIPATION REMEDIES: Patients are often constipated after surgery or  with use of oral narcotic medicine. You should continue to take the stool softener, Senokot-S during the next six weeks, and consume adequate amounts of water.  If you have not had a bowel movement for 3 days after dismissal, or are uncomfortable and unable to pass stool, please try one or all of the following measures:  1.  Milk of Magnesia - 30 cc by mouth every 12 hours   2.  Dulcolax suppository - One suppository per rectum every 4-6 hours   3.  Metamucil, Fibercon or other bulk former - use as directed  4.  Fleets Enema        PAIN MEDICATIONS:     Take your pain medications as instructed. It is best to take pain medications before your pain becomes severe. This will allow you to take less medication yet have better pain relief. For the first 2 or 3 days it may be helpful to take your pain medications on a regular schedule (e.g. every 4 to 6 hours). This will help you to keep your pain under better control. You should then begin to take fewer medications each day until you no longer need them. Do not take pain medication on an empty stomach. This may lead to nausea and vomiting.     Activity as tolerated   Order Comments: Return to normal activity slowly as you feel able.  For 6 weeks your exercise should be limited to walking.  You may walk as far as you wish, as long as you increase your level of exertion gradually and avoid slippery surfaces.    If you had a hysterectomy at the surgery do not insert anything in your vagina for 9 weeks.     Shower on day dressing removed (No bath)   Order Comments: You may shower at any time but should avoid immersing any abdominal incisions in water for at least 2 weeks after surgery or until the wound is completely healed.  If given, please shower with Hibaclens soap until bottle is completely finish        Follow-up Information       Barbara eBauchamp MD. Go on 4/21/2025.    Specialties: UroGynecology , Gynecology  Why: for post-op visit  Contact information:  [address]  [facility] [address]  [phone]                            Sharona Montalvo MD  OB/GYN PGY-3

## 2025-03-14 VITALS
OXYGEN SATURATION: 99 % | HEART RATE: 66 BPM | SYSTOLIC BLOOD PRESSURE: 101 MMHG | TEMPERATURE: 98 F | WEIGHT: 140.63 LBS | DIASTOLIC BLOOD PRESSURE: 58 MMHG | BODY MASS INDEX: 24.92 KG/M2 | RESPIRATION RATE: 16 BRPM | HEIGHT: 63 IN

## 2025-03-14 LAB
ANION GAP SERPL CALC-SCNC: 2 MMOL/L (ref 8–16)
BASOPHILS # BLD AUTO: 0.01 K/UL (ref 0–0.2)
BASOPHILS NFR BLD: 0.1 % (ref 0–1.9)
BUN SERPL-MCNC: 13 MG/DL (ref 8–23)
CALCIUM SERPL-MCNC: 9 MG/DL (ref 8.7–10.5)
CHLORIDE SERPL-SCNC: 107 MMOL/L (ref 95–110)
CO2 SERPL-SCNC: 28 MMOL/L (ref 23–29)
CREAT SERPL-MCNC: 0.7 MG/DL (ref 0.5–1.4)
DIFFERENTIAL METHOD BLD: ABNORMAL
EOSINOPHIL # BLD AUTO: 0 K/UL (ref 0–0.5)
EOSINOPHIL NFR BLD: 0 % (ref 0–8)
ERYTHROCYTE [DISTWIDTH] IN BLOOD BY AUTOMATED COUNT: 12.3 % (ref 11.5–14.5)
EST. GFR  (NO RACE VARIABLE): >60 ML/MIN/1.73 M^2
GLUCOSE SERPL-MCNC: 117 MG/DL (ref 70–110)
HCT VFR BLD AUTO: 35.3 % (ref 37–48.5)
HGB BLD-MCNC: 12 G/DL (ref 12–16)
IMM GRANULOCYTES # BLD AUTO: 0.02 K/UL (ref 0–0.04)
IMM GRANULOCYTES NFR BLD AUTO: 0.2 % (ref 0–0.5)
LYMPHOCYTES # BLD AUTO: 0.8 K/UL (ref 1–4.8)
LYMPHOCYTES NFR BLD: 9.6 % (ref 18–48)
MCH RBC QN AUTO: 30.1 PG (ref 27–31)
MCHC RBC AUTO-ENTMCNC: 34 G/DL (ref 32–36)
MCV RBC AUTO: 89 FL (ref 82–98)
MONOCYTES # BLD AUTO: 0.6 K/UL (ref 0.3–1)
MONOCYTES NFR BLD: 7 % (ref 4–15)
NEUTROPHILS # BLD AUTO: 7.1 K/UL (ref 1.8–7.7)
NEUTROPHILS NFR BLD: 83.1 % (ref 38–73)
NRBC BLD-RTO: 0 /100 WBC
PLATELET # BLD AUTO: 276 K/UL (ref 150–450)
PMV BLD AUTO: 8.8 FL (ref 9.2–12.9)
POTASSIUM SERPL-SCNC: 5 MMOL/L (ref 3.5–5.1)
RBC # BLD AUTO: 3.99 M/UL (ref 4–5.4)
SODIUM SERPL-SCNC: 137 MMOL/L (ref 136–145)
WBC # BLD AUTO: 8.56 K/UL (ref 3.9–12.7)

## 2025-03-14 PROCEDURE — 80048 BASIC METABOLIC PNL TOTAL CA: CPT

## 2025-03-14 PROCEDURE — 63600175 PHARM REV CODE 636 W HCPCS

## 2025-03-14 PROCEDURE — 25000003 PHARM REV CODE 250

## 2025-03-14 PROCEDURE — 36415 COLL VENOUS BLD VENIPUNCTURE: CPT

## 2025-03-14 PROCEDURE — 85025 COMPLETE CBC W/AUTO DIFF WBC: CPT

## 2025-03-14 RX ADMIN — OXYCODONE HYDROCHLORIDE 5 MG: 5 TABLET ORAL at 01:03

## 2025-03-14 RX ADMIN — IBUPROFEN 600 MG: 600 TABLET, FILM COATED ORAL at 08:03

## 2025-03-14 RX ADMIN — MUPIROCIN: 20 OINTMENT TOPICAL at 08:03

## 2025-03-14 RX ADMIN — ACETAMINOPHEN 650 MG: 325 TABLET, FILM COATED ORAL at 12:03

## 2025-03-14 RX ADMIN — DOCUSATE SODIUM 100 MG: 100 CAPSULE, LIQUID FILLED ORAL at 08:03

## 2025-03-14 RX ADMIN — ACETAMINOPHEN 650 MG: 325 TABLET, FILM COATED ORAL at 05:03

## 2025-03-14 RX ADMIN — OXYCODONE HYDROCHLORIDE 5 MG: 5 TABLET ORAL at 05:03

## 2025-03-14 RX ADMIN — HEPARIN SODIUM 5000 UNITS: 5000 INJECTION INTRAVENOUS; SUBCUTANEOUS at 05:03

## 2025-03-14 RX ADMIN — OXYCODONE HYDROCHLORIDE 10 MG: 5 TABLET ORAL at 08:03

## 2025-03-14 RX ADMIN — IBUPROFEN 600 MG: 600 TABLET, FILM COATED ORAL at 03:03

## 2025-03-14 NOTE — PLAN OF CARE
Nenita Armstrong has met all discharge criteria. Vital Signs are stable, ambulating  without difficulty, voiding spontaneously. Discharge instructions given, patient verbalized understanding. Discharged from facility via wheelchair in stable condition.

## 2025-03-14 NOTE — PLAN OF CARE
Case Management Assessment     PCP: Riky Nielsen MD  [facility]    Patient Arrived From: Home  Existing Help at Home: Spouse     Barriers to Discharge: None    Discharge Plan:    A. Home   B. Home with family     [facility] - Surgery ([facility])  Initial Discharge Assessment       Primary Care Provider: Riky Nielsen MD    Admission Diagnosis: Urinary incontinence, urge [N39.41]  Uterine prolapse [N81.4]  Cystocele, midline [N81.11]  Rectocele, female [N81.6]  Uterovaginal prolapse [N81.4]  S/P robot-assisted surgical procedure [Z98.890]    Admission Date: 3/13/2025  Expected Discharge Date:     Transition of Care Barriers: (P) None    Payor: BLUE CROSS BLUE SHIELD / Plan: BCBS BLUE SAVER PPO - HD / Product Type: PPO /     Extended Emergency Contact Information  Primary Emergency Contact: Brock Armstrong  Address: [address] [place] [address]  Mobile Phone: [phone]  Relation: Spouse  Preferred language: English   needed? No    Discharge Plan A: (P) Home with family, Home  Discharge Plan B: (P) Home      [id number] [place] [address] AT [facility] [address] [facility] [address] [address]  Phone: [phone] Fax: [phone]    [facility]  [address] [place] [id number]  Phone: [phone] Fax: [phone]      Initial Assessment (most recent)       Adult Discharge Assessment - 03/14/25 0827          Discharge Assessment    Assessment Type Discharge Planning Assessment (P)      Confirmed/corrected address, phone number and insurance Yes (P)      Confirmed Demographics Correct on Facesheet (P)      Source of Information patient;health record (P)      People in Home spouse (P)      Do you expect to return to your current living situation? Yes (P)      Do you have help at home or someone to help you manage your care at home? Yes (P)      Prior to  hospitilization cognitive status: Alert/Oriented;No Deficits (P)      Current cognitive status: Alert/Oriented;No Deficits (P)      Equipment Currently Used at Home none (P)      Readmission within 30 days? No (P)      Patient currently being followed by outpatient case management? No (P)      Do you currently have service(s) that help you manage your care at home? No (P)      Do you take prescription medications? No (P)      Do you have prescription coverage? Yes (P)      Do you have any problems affording any of your prescribed medications? No (P)      Is the patient taking medications as prescribed? yes (P)      How do you get to doctors appointments? family or friend will provide;car, drives self (P)      Are you on dialysis? No (P)      Do you take coumadin? No (P)      Discharge Plan A Home with family;Home (P)      Discharge Plan B Home (P)      Discharge Plan discussed with: Patient (P)      Transition of Care Barriers None (P)

## 2025-03-17 ENCOUNTER — RESULTS FOLLOW-UP (OUTPATIENT)
Dept: UROGYNECOLOGY | Facility: CLINIC | Age: 65
End: 2025-03-17

## 2025-03-17 LAB
FINAL PATHOLOGIC DIAGNOSIS: NORMAL
GROSS: NORMAL
Lab: NORMAL

## 2025-03-26 ENCOUNTER — TELEPHONE (OUTPATIENT)
Dept: UROGYNECOLOGY | Facility: CLINIC | Age: 65
End: 2025-03-26
Payer: COMMERCIAL

## 2025-03-26 NOTE — TELEPHONE ENCOUNTER
----- Message from Kate sent at 3/26/2025 12:15 PM CDT -----  Regarding: concerns  Name of Who is Calling: Nenita What is the request in detail: Patient is requesting a call back because she still have the sterile strips still on and want to know can she take them off now.  Can the clinic reply by [facility]: Yes What Number to [facility] Back [facility]  [phone]

## 2025-03-26 NOTE — TELEPHONE ENCOUNTER
----- Message from Kate sent at 3/26/2025 12:15 PM CDT -----  Regarding: concerns  Name of Who is Calling: Nenita What is the request in detail: Patient is requesting a call back because she still have the sterile strips still on and want to know can she take them off now.  Can the clinic reply by [facility]: Yes What Number to Call Back [facility] not [facility]:  [phone]

## 2025-03-26 NOTE — TELEPHONE ENCOUNTER
Spoke to patient.  Instructed patient that she can take the steri-strip off with warm water.  Patient verbalized understanding.  Call ended

## 2025-04-21 ENCOUNTER — OFFICE VISIT (OUTPATIENT)
Dept: UROGYNECOLOGY | Facility: CLINIC | Age: 65
End: 2025-04-21
Payer: COMMERCIAL

## 2025-04-21 VITALS
DIASTOLIC BLOOD PRESSURE: 77 MMHG | HEIGHT: 63 IN | BODY MASS INDEX: 24.38 KG/M2 | HEART RATE: 70 BPM | WEIGHT: 137.56 LBS | SYSTOLIC BLOOD PRESSURE: 114 MMHG

## 2025-04-21 DIAGNOSIS — Z98.890 S/P ROBOT-ASSISTED SURGICAL PROCEDURE: Primary | ICD-10-CM

## 2025-04-21 DIAGNOSIS — N95.2 VAGINAL ATROPHY: ICD-10-CM

## 2025-04-21 PROCEDURE — 1159F MED LIST DOCD IN RCRD: CPT | Mod: CPTII,S$GLB,, | Performed by: OBSTETRICS & GYNECOLOGY

## 2025-04-21 PROCEDURE — 99999 PR PBB SHADOW E&M-EST. PATIENT-LVL IV: CPT | Mod: PBBFAC,,, | Performed by: OBSTETRICS & GYNECOLOGY

## 2025-04-21 PROCEDURE — 3078F DIAST BP <80 MM HG: CPT | Mod: CPTII,S$GLB,, | Performed by: OBSTETRICS & GYNECOLOGY

## 2025-04-21 PROCEDURE — 99024 POSTOP FOLLOW-UP VISIT: CPT | Mod: S$GLB,,, | Performed by: OBSTETRICS & GYNECOLOGY

## 2025-04-21 PROCEDURE — 3074F SYST BP LT 130 MM HG: CPT | Mod: CPTII,S$GLB,, | Performed by: OBSTETRICS & GYNECOLOGY

## 2025-04-21 PROCEDURE — 1160F RVW MEDS BY RX/DR IN RCRD: CPT | Mod: CPTII,S$GLB,, | Performed by: OBSTETRICS & GYNECOLOGY

## 2025-04-21 NOTE — PROGRESS NOTES
Urogyn follow up  04/27/2025    [facility]OG[facility]  [address] [address]    Nenita Armstrong  [id number]  1960      Nenita Armstrong is a 64 y.o. here for postop appt.      Date of Operation: 03/13/2025     Title of Operation:  1)  Robotic-assisted total laparoscopic hysterectomy with bilateral salpingo-oophorectomy  2)  Robotic-assisted laparoscopic sacral colpopexy with polypropylene mesh  3)  Placement of retropubic tension-free midurethral sling, Advantage Fit (Jukely)  4)  Cystourethroscopy     Indications for Surgery:  1)  UI:  (--) GAB. (+) UUI (occasionally, ann 1st AM).  (--) pads, +rare underwear changes.  Daytime frequency: Q 3 hours.  Nocturia: Yes: 1/night.   (--) dysuria,  (--) hematuria,  (--) frequent UTIs.  (--) complete bladder emptying. PV to help.      2)  POP:  Present. below introitus.  Symptoms:(+)  pressure, feels weird, has to PV.  (--) vaginal bleeding. (--) vaginal discharge. (--) sexually active.  (--) h/o dyspareunia.   has had some ED.  (--)  Vaginal dryness.  (--) vaginal estrogen use. Is supposed to use vaginal estrogen.    POP-Q:  Aa +3; Ba +5; C +6; Ap +3; Bp +5; D -3.  Genital hiatus 4, perineal body 3, total vaginal length 10-11.   Pvr 50 mL     3)  BM:  (--) constipation/straining.  (--) chronic diarrhea. (--) hematochezia.  (--) fecal incontinence.  (--) fecal smearing/urgency.  (+) complete evacuation.      02/18/2024  Pelvic ultrasound  The uterus measures 7.8 x 2.9 x 4.2 cm.  The endometrial stripe measures 2 mm on the midline sagittal image.  Neither ovary is seen despite transabdominal transvaginal technique.  His there is a hypoechoic mass centered in the lower endometrial region measuring 10 x 7 x 7 mm.  Whether this relates to submucosal fibroid or his endometrial mass is uncertain.  Is the   Impression:   Hypoechoic apparently solid endometrial mass which could represent a submucosal fibroid or an  endometrial mass.  More proximally the endometrial stripe is not enlarged measuring 2-3 mm.     Changes from last visit:  Voiding every 2 hours during the day and denies nocturia  Rare UUI in early am  Denies constipation or straining     03/06/2025  Suds  1.  VOIDING PHASE:       a.  Uroflowmetry:  Prolapse reduction: No  Voided volume:  138 mL   Voiding time:   59 seconds  Max flow:  13 mL/s  Avg flow:   5 mL/s   PVR:   5 mL     The overall configuration of this uroflow study was with insufficient volume for interpretation.       b.  Pressure flow:  Prolapse reduction: No  Voided volume:   465 mL  Voiding time:   84 seconds  Peak flow:  25 mL/s   Avg flow:  8 mL/s  Max det pressure:  75  cm H20  Det pressure at max flow: 47 cm H20  Void initiated by detrusor contraction followed by Valsalva.    Urethral relaxation (EMG):  present.    PVR (calculated):  0 mL     The overall configuration of this pressure flow study was prolonged with concern for voiding dysfunction (Valsalva assist).       2.  FILLING PHASE:  1st desire: 153 mL  Normal desire:  204 mL  Strong desire:  380 mL  Urgency:  413 mL  Compliance (calculated)  approx 150 mL/cm H20  EMG activity during filling:  stable  Detrusor contractions observed: No.       3.  URETHRAL FUNCTION/STORAGE PHASE:     a.  WITH prolapse reduction:  CLPP (150 mL): Positive  at  109 cm H20  VLPP (150 mL): Positive  at  52 cm H20   CLPP (300 mL): Positive  at  135 cm H20  VLPP (300 mL): Positive  at  62 cm H20      These findings are consistent with Positive urodynamic stress incontinence.     Assessment:  UF with insufficient volume for interpretation.   PF prolonged with concern for voiding dysfunction (Valsalva assist).  Compliance normal.  Max capacity 465.  DO (--).  GAB (+).       Cysto  Normal    Issues include:  Problem List[1]    History since last visit:   GYN: no discharge, +occasional pink on wiping; no pain, s/sx POP   Bladder issues: No UI, dysuria, U/F; +complete  "emptying  Bowel issues: no major constipation/straining; off colace    Medications:  Current Medications[2]    ROS:  As per HPI.      Exam  /77 (BP Location: Right arm, Patient Position: Sitting)   Pulse 70   Ht 5' 3" (1.6 m)   Wt 62.4 kg (137 lb 9.1 oz)   BMI 24.37 kg/m²   General: alert and oriented, no acute distress  Respiratory: normal respiratory effort  Abd: soft, non-tender, non-distended  LSC well-healed    Pelvic  Ext. Genitalia: normal external genitalia. Normal bartholins and skenes glands  Vagina: + atrophy. Normal vaginal mucosa without lesions. No discharge noted.   Non-tender bladder base without palpable mass. Sling path/cuff with sutures intact, NT, no mesh visible/palpable.   Cervix: absent  Uterus:  surgically absent, vaginal cuff well healing  Urethra: no masses or tenderness  Urethral meatus: no lesions, caruncle or prolapse.    POP-Q: deferred. No POP with valsalva.     Impression  1. S/P RA-TLH/BSO/SCP/MUS        2. Vaginal atrophy          We reviewed the above issues and discussed options for short-term versus long-term management of their problems.   Plan:   Postop state: wait until after next visit/healed to start intercourse. Can start baths. Can start light exercise.  Avoid lifting >50 lbs.   Always want to avoid straining bowel movements--can make bulge return.   For constipation/straining: try miralax, stool softener, daily fiber.   Vaginal atrophy (dryness):    After next visit: Start Vaginal estrogen: Use 0.5 grams of estrogen cream in vagina with applicator or dime-sized amount with finger (as far as can reach internally) nightly x 2 weeks, then twice a week thereafter.  You can also apply a dime-sized amount with your finger around the vaginal opening and inner lips at same frequency.   Vaginal estrogen may help to decrease pain related to dryness with intercourse and urinary symptoms (urgency/frequency/UTIs) around menopause.   They will follow up with us in 1 month PO " check.   20 minutes were spent in face to face time with this patient  90 % of this time was spent in counseling and/or coordination of care     Barbara Beauchamp MD  [facility]  Division of Female Pelvic Medicine and Reconstructive Surgery  Department of Obstetrics & Gynecology           [1]   Patient Active Problem List  Diagnosis    Osteopenia of multiple sites    Uterine prolapse    Vitamin D deficiency    Urinary incontinence, urge    Rectocele, female    Cystocele, midline    Vaginal atrophy    S/P RA-TLH/BSO/SCP/MUS   [2]   Current Outpatient Medications:     cholecalciferol, vitD3,/vit K2 (VITAMIN D3-VITAMIN K2 ORAL), Take 1 tablet by mouth once daily. 5000, Disp: , Rfl:     estradioL (ESTRACE) 0.01 % (0.1 mg/gram) vaginal cream, 0.5 grams with applicator or dime-sized amount with finger in vagina nightly x 2 weeks, then twice a week thereafter, Disp: 42.5 g, Rfl: 11    acetaminophen (TYLENOL) 325 MG tablet, Take 1.5 tablets (487.5 mg total) by mouth every 6 (six) hours as needed for Pain. Alternate between ibuprofen and tylenol every 3 hours. For example: @0800: ibuprofen 600mg @1100: tylenol 650mg @1400: ibuprofen 600mg @1700: tylenol 650 mg @2000: ibuprofen 600mg, Disp: 60 tablet, Rfl: 1    docusate sodium (COLACE) 100 MG capsule, Take 2 capsules (200 mg total) by mouth 2 (two) times daily as needed for Constipation., Disp: 60 capsule, Rfl: 1    ibuprofen (ADVIL,MOTRIN) 600 MG tablet, Take 1 tablet (600 mg total) by mouth every 6 (six) hours as needed for Pain. Alternate between ibuprofen and tylenol every 3 hours. For example: @0800: ibuprofen 600mg @1100: tylenol 650mg @1400: ibuprofen 600mg @1700: tylenol 650 mg @2000: ibuprofen 600mg, Disp: 60 tablet, Rfl: 1    oxyCODONE (ROXICODONE) 5 MG immediate release tablet, Take 1 tablet (5 mg total) by mouth every 4 (four) hours as needed for Pain., Disp: 35 tablet, Rfl: 0

## 2025-04-21 NOTE — PATIENT INSTRUCTIONS
Postop state: wait until after next visit/healed to start intercourse. Can start baths. Can start light exercise.  Avoid lifting >50 lbs.   Always want to avoid straining bowel movements--can make bulge return.   For constipation/straining: try miralax, stool softener, daily fiber.   Vaginal atrophy (dryness):    After next visit: Start Vaginal estrogen: Use 0.5 grams of estrogen cream in vagina with applicator or dime-sized amount with finger (as far as can reach internally) nightly x 2 weeks, then twice a week thereafter.  You can also apply a dime-sized amount with your finger around the vaginal opening and inner lips at same frequency.   Vaginal estrogen may help to decrease pain related to dryness with intercourse and urinary symptoms (urgency/frequency/UTIs) around menopause.   They will follow up with us in 1 month PO check.

## 2025-04-28 ENCOUNTER — TELEPHONE (OUTPATIENT)
Dept: UROGYNECOLOGY | Facility: CLINIC | Age: 65
End: 2025-04-28
Payer: COMMERCIAL

## 2025-04-28 NOTE — TELEPHONE ENCOUNTER
Returned call- assisted scheduling appointment.     ----- Message from Kate sent at 4/28/2025  3:23 PM CDT -----  Regarding: appt request  Name of Who is Calling: Nenita What is the request in detail: Patient is requesting a call back to schedule a month follow up appointment with NP. Can the clinic reply by [facility]: Yes What Number to Call Back if not [facility]: [phone]

## 2025-05-20 ENCOUNTER — OFFICE VISIT (OUTPATIENT)
Dept: UROGYNECOLOGY | Facility: CLINIC | Age: 65
End: 2025-05-20
Payer: COMMERCIAL

## 2025-05-20 VITALS
WEIGHT: 140.88 LBS | DIASTOLIC BLOOD PRESSURE: 73 MMHG | BODY MASS INDEX: 24.96 KG/M2 | SYSTOLIC BLOOD PRESSURE: 136 MMHG | HEART RATE: 68 BPM | HEIGHT: 63 IN

## 2025-05-20 DIAGNOSIS — Z98.890 S/P ROBOT-ASSISTED SURGICAL PROCEDURE: Primary | ICD-10-CM

## 2025-05-20 DIAGNOSIS — N95.2 VAGINAL ATROPHY: ICD-10-CM

## 2025-05-20 PROCEDURE — 99999 PR PBB SHADOW E&M-EST. PATIENT-LVL III: CPT | Mod: PBBFAC,,,

## 2025-05-20 RX ORDER — ESTRADIOL 0.1 MG/G
CREAM VAGINAL
Qty: 42.5 G | Refills: 11 | Status: SHIPPED | OUTPATIENT
Start: 2025-05-20

## 2025-05-20 NOTE — PROGRESS NOTES
[facility] - UROGYNECOLOGY  [address] [address]  May 20, 2025     Nenita Armstrong  [id number]  1960    UROGYN POST-OP  5/20/2025     Nenita Armstrong is a 64 y.o. here for a post operative visit.     Date of Operation: 03/13/2025     Title of Operation:  1)  Robotic-assisted total laparoscopic hysterectomy with bilateral salpingo-oophorectomy  2)  Robotic-assisted laparoscopic sacral colpopexy with polypropylene mesh  3)  Placement of retropubic tension-free midurethral sling, Advantage Fit ([employer])  4)  Cystourethroscopy     Indications for Surgery:  1)  UI:  (--) GAB. (+) UUI (occasionally, ann 1st AM).  (--) pads, +rare underwear changes.  Daytime frequency: Q 3 hours.  Nocturia: Yes: 1/night.   (--) dysuria,  (--) hematuria,  (--) frequent UTIs.  (--) complete bladder emptying. PV to help.      2)  POP:  Present. below introitus.  Symptoms:(+)  pressure, feels weird, has to PV.  (--) vaginal bleeding. (--) vaginal discharge. (--) sexually active.  (--) h/o dyspareunia.   has had some ED.  (--)  Vaginal dryness.  (--) vaginal estrogen use. Is supposed to use vaginal estrogen.    POP-Q:  Aa +3; Ba +5; C +6; Ap +3; Bp +5; D -3.  Genital hiatus 4, perineal body 3, total vaginal length 10-11.   Pvr 50 mL     3)  BM:  (--) constipation/straining.  (--) chronic diarrhea. (--) hematochezia.  (--) fecal incontinence.  (--) fecal smearing/urgency.  (+) complete evacuation.      02/18/2024  Pelvic ultrasound  The uterus measures 7.8 x 2.9 x 4.2 cm.  The endometrial stripe measures 2 mm on the midline sagittal image.  Neither ovary is seen despite transabdominal transvaginal technique.  His there is a hypoechoic mass centered in the lower endometrial region measuring 10 x 7 x 7 mm.  Whether this relates to submucosal fibroid or his endometrial mass is uncertain.  Is the   Impression:   Hypoechoic apparently solid endometrial mass which could represent a submucosal  fibroid or an endometrial mass.  More proximally the endometrial stripe is not enlarged measuring 2-3 mm.     Changes from last visit:  Voiding every 2 hours during the day and denies nocturia  Rare UUI in early am  Denies constipation or straining     03/06/2025  Suds  1.  VOIDING PHASE:       a.  Uroflowmetry:  Prolapse reduction: No  Voided volume:  138 mL   Voiding time:   59 seconds  Max flow:  13 mL/s  Avg flow:   5 mL/s   PVR:   5 mL     The overall configuration of this uroflow study was with insufficient volume for interpretation.       b.  Pressure flow:  Prolapse reduction: No  Voided volume:   465 mL  Voiding time:   84 seconds  Peak flow:  25 mL/s   Avg flow:  8 mL/s  Max det pressure:  75  cm H20  Det pressure at max flow: 47 cm H20  Void initiated by detrusor contraction followed by Valsalva.    Urethral relaxation (EMG):  present.    PVR (calculated):  0 mL     The overall configuration of this pressure flow study was prolonged with concern for voiding dysfunction (Valsalva assist).       2.  FILLING PHASE:  1st desire: 153 mL  Normal desire:  204 mL  Strong desire:  380 mL  Urgency:  413 mL  Compliance (calculated)  approx 150 mL/cm H20  EMG activity during filling:  stable  Detrusor contractions observed: No.       3.  URETHRAL FUNCTION/STORAGE PHASE:     a.  WITH prolapse reduction:  CLPP (150 mL): Positive  at  109 cm H20  VLPP (150 mL): Positive  at  52 cm H20   CLPP (300 mL): Positive  at  135 cm H20  VLPP (300 mL): Positive  at  62 cm H20      These findings are consistent with Positive urodynamic stress incontinence.     Assessment:  UF with insufficient volume for interpretation.   PF prolonged with concern for voiding dysfunction (Valsalva assist).  Compliance normal.  Max capacity 465.  DO (--).  GAB (+).       Cysto  Normal     Issues include:  [Problem List]    [Problem List]  Patient Active Problem List      Diagnosis    Osteopenia of multiple sites    Uterine prolapse    Vitamin D  deficiency    Urinary incontinence, urge    Rectocele, female    Cystocele, midline    Vaginal atrophy    S/P RA-TLH/BSO/SCP/MUS        4/21/2025:   GYN: no discharge, +occasional pink on wiping; no pain, s/sx POP   Bladder issues: No UI, dysuria, U/F; +complete emptying  Bowel issues: no major constipation/straining; off colace    Changes since last visit (4/21/2025):  -- No vaginal discharge or bleeding. No pain. No s/sx POP  Bladder issues: No UI, dysuria, U/F; +complete emptying  Bowel issues: no major constipation/straining; off colace    Past Medical History:   Diagnosis Date    Kidney stone 2017       Past Surgical History:   Procedure Laterality Date    COSMETIC SURGERY      Breast implants    CYSTOSCOPY N/A 3/13/2025    Procedure: CYSTOSCOPY;  Surgeon: Barbara Beauchamp MD;  Location: [facility];  Service: Gynecology Urology;  Laterality: N/A;    INSERTION OF MIDURETHRAL SLING N/A 3/13/2025    Procedure: SLING, MIDURETHRAL;  Surgeon: Barbara Beauchamp MD;  Location: [facility];  Service: Gynecology Urology;  Laterality: N/A;    ROBOT-ASSISTED LAPAROSCOPIC ABDOMINAL SACROCOLPOPEXY USING DA CHARITO XI N/A 3/13/2025    Procedure: XI ROBOTIC SACROCOLPOPEXY, ABDOMINAL;  Surgeon: Barbara Beauchamp MD;  Location: [facility];  Service: Gynecology Urology;  Laterality: N/A;    ROBOTIC HYSTERECTOMY, WITH SALPINGO-OOPHORECTOMY Bilateral 3/13/2025    Procedure: ROBOTIC HYSTERECTOMY,WITH SALPINGO-OOPHORECTOMY;  Surgeon: Barbara Beauchamp MD;  Location: [facility];  Service: Gynecology Urology;  Laterality: Bilateral;  4hr       Family History   Problem Relation Name Age of Onset    Lung cancer Mother Maddy Irwin         not smoker    Cancer Mother Maddy Irwin     Stomach cancer Father Santosh Irwin         age 80's s/p sx and no chemo    Cancer Father Santosh Irwin     No Known Problems Sister         Social History     Socioeconomic History    Marital status:     Number of children: 4  "  Occupational History     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     Social Drivers of Health     Financial Resource Strain: Low Risk  (3/12/2025)    Overall Financial Resource Strain (CARDIA)     Difficulty of Paying Living Expenses: Not hard at all   Food Insecurity: No Food Insecurity (3/12/2025)    Hunger Vital Sign     Worried About Running Out of Food in the Last Year: Never true     Ran Out of Food in the Last Year: Never true   Transportation Needs: No Transportation Needs (3/12/2025)    PRAPARE - Transportation     Lack of Transportation (Medical): No     Lack of Transportation (Non-Medical): No   Physical Activity: Inactive (3/12/2025)    Exercise Vital Sign     Days of Exercise per Week: 0 days     Minutes of Exercise per Session: 30 min   Stress: No Stress Concern Present (3/12/2025)    [employer] - Occupational Stress Questionnaire     Feeling of Stress : Not at all   Housing Stability: Low Risk  (3/12/2025)    Housing Stability Vital Sign     Unable to Pay for Housing in the Last Year: No     Number of Times Moved in the Last Year: 1     Homeless in the Last Year: No       Current Medications[1]    Review of patient's allergies indicates:  No Known Allergies     ROS  Per HPI    Physical Exam  /73 (BP Location: Left arm, Patient Position: Sitting)   Pulse 68   Ht 5' 3" (1.6 m)   Wt 63.9 kg (140 lb 14 oz)   BMI 24.95 kg/m²   General: alert and oriented, no acute distress  Respiratory: normal respiratory effort  Abd: soft, non-tender, non-distended  Pelvic:  Ext. Genitalia: normal external genitalia. Normal bartholin's and skeens glands  Vagina: + " atrophy. Normal vaginal mucosa without lesions. No discharge noted. Non-tender bladder base without palpable mass. Sling path/cuff with sutures intact, NT, no mesh visible/palpable.   Cervix: absent  Uterus:  surgically absent, vaginal cuff well healed   Urethra: no masses or tenderness  Urethral meatus: no lesions, caruncle or prolapse.    POP-Q:  deferred. No POP with valsalva.     Impression  1. S/P RA-TLH/BSO/SCP/MUS        2. Vaginal atrophy          We reviewed the above issues and discussed options for short-term versus long-term management of her problems.     Plan:   Postop state: 9.5 weeks post-op and healing well. You may return to working and house work as usual. You are cleared for sexual intercourse. Can continue baths. Can continue light exercise. Avoid lifting >50 lbs.   -- Always want to avoid straining bowel movements--can make bulge return.   -- For constipation/straining: try miralax, stool softener, daily fiber.   Vaginal atrophy (dryness):    --Start Vaginal estrogen: Use 0.5 grams of estrogen cream in vagina with applicator or dime-sized amount with finger (as far as can reach internally) nightly x 2 weeks, then twice a week thereafter.  You can also apply a dime-sized amount with your finger around the vaginal opening and inner lips at same frequency.    They will follow up with us in 6 month post-op    I spent a total of 30 minutes on the day of the visit.  This includes face to face time and non-face to face time preparing to see the patient (eg, review of tests), obtaining and/or reviewing separately obtained history, documenting clinical information in the electronic or other health record, independently interpreting results and communicating results to the patient/family/caregiver, or care coordinator.     Yeimi Cheung PA-C  [facility]  Division of Female Pelvic Medicine and Reconstructive Surgery  Department of Obstetrics & Gynecology            [1]   Current  Outpatient Medications   Medication Sig Dispense Refill    cholecalciferol, vitD3,/vit K2 (VITAMIN D3-VITAMIN K2 ORAL) Take 1 tablet by mouth once daily. 5000      estradioL (ESTRACE) 0.01 % (0.1 mg/gram) vaginal cream 0.5 grams with applicator or dime-sized amount with finger in vagina nightly x 2 weeks, then twice a week thereafter 42.5 g 11    acetaminophen (TYLENOL) 325 MG tablet Take 1.5 tablets (487.5 mg total) by mouth every 6 (six) hours as needed for Pain. Alternate between ibuprofen and tylenol every 3 hours. For example: @0800: ibuprofen 600mg @1100: tylenol 650mg @1400: ibuprofen 600mg @1700: tylenol 650 mg @2000: ibuprofen 600mg 60 tablet 1    docusate sodium (COLACE) 100 MG capsule Take 2 capsules (200 mg total) by mouth 2 (two) times daily as needed for Constipation. 60 capsule 1    ibuprofen (ADVIL,MOTRIN) 600 MG tablet Take 1 tablet (600 mg total) by mouth every 6 (six) hours as needed for Pain. Alternate between ibuprofen and tylenol every 3 hours. For example: @0800: ibuprofen 600mg @1100: tylenol 650mg @1400: ibuprofen 600mg @1700: tylenol 650 mg @2000: ibuprofen 600mg 60 tablet 1    oxyCODONE (ROXICODONE) 5 MG immediate release tablet Take 1 tablet (5 mg total) by mouth every 4 (four) hours as needed for Pain. 35 tablet 0     No current facility-administered medications for this visit.

## 2025-05-20 NOTE — PATIENT INSTRUCTIONS
Postop state: 9.5 weeks post-op and healing well. You may return to working and house work as usual. You are cleared for sexual intercourse. Can continue baths. Can continue light exercise. Avoid lifting >50 lbs.   -- Always want to avoid straining bowel movements--can make bulge return.   -- For constipation/straining: try miralax, stool softener, daily fiber.   Vaginal atrophy (dryness):    --Start Vaginal estrogen: Use 0.5 grams of estrogen cream in vagina with applicator or dime-sized amount with finger (as far as can reach internally) nightly x 2 weeks, then twice a week thereafter.  You can also apply a dime-sized amount with your finger around the vaginal opening and inner lips at same frequency.    They will follow up with us in 6 month post-op

## 2025-09-04 ENCOUNTER — OFFICE VISIT (OUTPATIENT)
Dept: UROGYNECOLOGY | Facility: CLINIC | Age: 65
End: 2025-09-04
Payer: MEDICARE

## 2025-09-04 VITALS
DIASTOLIC BLOOD PRESSURE: 65 MMHG | SYSTOLIC BLOOD PRESSURE: 126 MMHG | WEIGHT: 138.69 LBS | HEART RATE: 70 BPM | BODY MASS INDEX: 24.56 KG/M2

## 2025-09-04 DIAGNOSIS — N95.2 VAGINAL ATROPHY: ICD-10-CM

## 2025-09-04 DIAGNOSIS — Z98.890 S/P ROBOT-ASSISTED SURGICAL PROCEDURE: Primary | ICD-10-CM

## 2025-09-04 PROCEDURE — 99999 PR PBB SHADOW E&M-EST. PATIENT-LVL III: CPT | Mod: PBBFAC,,, | Performed by: NURSE PRACTITIONER

## 2025-09-04 PROCEDURE — 99213 OFFICE O/P EST LOW 20 MIN: CPT | Mod: PBBFAC | Performed by: NURSE PRACTITIONER

## (undated) DEVICE — COVER TIP CURVED SCISSORS XI

## (undated) DEVICE — DRAPE STERI INSTRUMENT 1018

## (undated) DEVICE — CATH FOL IC 3WAY 16F 5CCLF

## (undated) DEVICE — SYS SEE SHARP SCP ANTIFG LNG

## (undated) DEVICE — SUT PDS II 2-0 CT-2 VIL

## (undated) DEVICE — SEAL CANN UNIVERSAL 5-12MM

## (undated) DEVICE — DRAPE ARM DAVINCI XI

## (undated) DEVICE — GLOVE SENSICARE PI SURG 7

## (undated) DEVICE — ADHESIVE DERMABOND ADVANCED

## (undated) DEVICE — SUT VICRYL PLUS 0 CT1 36IN

## (undated) DEVICE — PORT ACCESS 8MM W/120MM LOW

## (undated) DEVICE — SOL NORMAL USPCA 0.9%

## (undated) DEVICE — SPONGE LAP VAG STRL DMT 4X36

## (undated) DEVICE — GLOVE SENSICARE PI SURG 6.5

## (undated) DEVICE — SOL STRL WATER INJ 1000ML BG

## (undated) DEVICE — NDL HYPO REG 25G X 1 1/2

## (undated) DEVICE — SUT VICRYL CTD 2-0 GI 27 SH

## (undated) DEVICE — SOL POVIDONE PREP IODINE 4 OZ

## (undated) DEVICE — IRRIGATOR ENDOSCOPY DISP.

## (undated) DEVICE — SET IRR CYSTO Y DRP CHMBR 80IN

## (undated) DEVICE — JELLY SURGILUBE 5GR

## (undated) DEVICE — SOL ELECTROLUBE ANTI-STIC

## (undated) DEVICE — KIT WING PAD POSITIONING

## (undated) DEVICE — ELECTRODE REM PLYHSV RETURN 9

## (undated) DEVICE — GLOVE SENSICARE PI GRN 6.5

## (undated) DEVICE — HOOK STAY ELAS 5MM 8EA/PK

## (undated) DEVICE — SUT ABSRB MONO 2-0 CT-2 27IN

## (undated) DEVICE — TRAY DO THE ROBOT

## (undated) DEVICE — CLIPPER BLADE MOD 4406 (CAREF)

## (undated) DEVICE — SET TRI-LUMEN FILTERED TUBE

## (undated) DEVICE — Device

## (undated) DEVICE — SUT 2/0 30IN PROLENE MONO

## (undated) DEVICE — SUT VICRYL 0 27 CT-2

## (undated) DEVICE — CATH SELECTSILICON FOL 16F 10M

## (undated) DEVICE — TROCAR ENDOPATH XCEL 5X100MM

## (undated) DEVICE — OCCLUDER COLPO-PNEUMO STERILE

## (undated) DEVICE — DRAPE ADPT RUMI II ADVINCULA

## (undated) DEVICE — DRAPE COLUMN DAVINCI XI

## (undated) DEVICE — SOL IRR SOD CHL .9% POUR

## (undated) DEVICE — PAD PREP CUFFED NS 24X48IN

## (undated) DEVICE — GLOVE SENSICARE PI GRN 7

## (undated) DEVICE — TIP RUMI BLUE DISPOSABLE 5/BX

## (undated) DEVICE — SYR SALINE FLSH PRFL STRL 10ML

## (undated) DEVICE — SUT MCRYL PLUS 4-0 PS2 27IN

## (undated) DEVICE — SYR 10CC LUER LOCK

## (undated) DEVICE — POSITIONER HEEL FOAM CONVOLTD

## (undated) DEVICE — BLADE SURG STAINLESS STEEL #15

## (undated) DEVICE — SUT ETHIBOND EXCEL 0 CT2 30

## (undated) DEVICE — SUT PDS II O CT-2 VIL MONO

## (undated) DEVICE — SOL POVIDONE SCRUB IODINE 4 OZ

## (undated) DEVICE — OBTURATOR BLADELESS 8MM XI CLR

## (undated) DEVICE — SYR IRRIGATION BULB STER 60ML